# Patient Record
Sex: FEMALE | Race: WHITE
[De-identification: names, ages, dates, MRNs, and addresses within clinical notes are randomized per-mention and may not be internally consistent; named-entity substitution may affect disease eponyms.]

---

## 2018-07-16 ENCOUNTER — HOSPITAL ENCOUNTER (OUTPATIENT)
Dept: HOSPITAL 47 - RADBDWWP | Age: 58
Discharge: HOME | End: 2018-07-16
Attending: INTERNAL MEDICINE
Payer: MEDICARE

## 2018-07-16 DIAGNOSIS — M89.8X9: Primary | ICD-10-CM

## 2018-07-16 PROCEDURE — 77080 DXA BONE DENSITY AXIAL: CPT

## 2018-07-16 NOTE — BD
EXAMINATION TYPE: Axial Bone Density

 

DATE OF EXAM: 7/16/2018

 

COMPARISON: NONE

 

CLINICAL HISTORY: 57-year-old female with bone pain

 

Height:  64 IN

Weight:  173 LBS

 

FRAX RISK QUESTIONS:

          Secondary Osteoporosis:

   Current Tobacco Use: YES

 

RISK FACTORS 

HISTORY OF: 

History of Wrist Fracture: RT WRIST AGE AGE 49

When: AGE 49

Active: MODERATE

Diet low in dairy products/other sources of calcium:  YES

Postmenopausal woman: AGE 49

Frequent falls: YES  DUE TO NUMBNESS IN LEGS

MEDICATIONS: 

Additional Medications: LAMICTAL, QUETIPINE, EFFEXOR, ATIVAN, TRAMADOL, POLYETHYLENE, LISINOPRIL, 

 

 

 

EXAM MEASUREMENTS: 

Bone mineral densitometry was performed using the Printed Piece System.

Bone mineral density as measured about the Lumbar spine is:  

----- L1-L4(G/cm2): 1.390

T Score Values are as follows:

----- L2: 0.7

----- L3: 2.6

----- L4: 2.9

----- L1-L4: 1.8

Bone mineral density BASELINE

 

Bone mineral density about the R hip (g/cm2): 1.075

Bone mineral density about the L hip (g/cm2): 1.105

T Score values are as follows:

-----R Neck: 0.3

-----L Neck: 0.5

-----R Total: 0.0

-----L Total: 0.3

Bone mineral density BASELINE

 

 

IMPRESSION:

Normal (Values between +1 and -1 indicate normal bone mass).  Consider repeating this study in 5 year
s or sooner if there is some new clinical indication.

 

 

 

 

 

NOTE:  T-SCORE=SD OF THE YOUNG ADULT MEAN.

## 2018-08-07 ENCOUNTER — HOSPITAL ENCOUNTER (INPATIENT)
Dept: HOSPITAL 47 - EC | Age: 58
LOS: 9 days | Discharge: HOME | DRG: 885 | End: 2018-08-16
Payer: MEDICARE

## 2018-08-07 DIAGNOSIS — Z81.8: ICD-10-CM

## 2018-08-07 DIAGNOSIS — L65.9: ICD-10-CM

## 2018-08-07 DIAGNOSIS — J44.9: ICD-10-CM

## 2018-08-07 DIAGNOSIS — K51.90: ICD-10-CM

## 2018-08-07 DIAGNOSIS — M54.9: ICD-10-CM

## 2018-08-07 DIAGNOSIS — Z87.19: ICD-10-CM

## 2018-08-07 DIAGNOSIS — Z91.5: ICD-10-CM

## 2018-08-07 DIAGNOSIS — R45.851: ICD-10-CM

## 2018-08-07 DIAGNOSIS — F31.5: Primary | ICD-10-CM

## 2018-08-07 DIAGNOSIS — Z81.1: ICD-10-CM

## 2018-08-07 DIAGNOSIS — F17.200: ICD-10-CM

## 2018-08-07 DIAGNOSIS — F41.0: ICD-10-CM

## 2018-08-07 DIAGNOSIS — Z79.899: ICD-10-CM

## 2018-08-07 DIAGNOSIS — Z82.49: ICD-10-CM

## 2018-08-07 DIAGNOSIS — K57.90: ICD-10-CM

## 2018-08-07 DIAGNOSIS — Z90.710: ICD-10-CM

## 2018-08-07 DIAGNOSIS — Z82.5: ICD-10-CM

## 2018-08-07 DIAGNOSIS — I10: ICD-10-CM

## 2018-08-07 DIAGNOSIS — H26.9: ICD-10-CM

## 2018-08-07 PROCEDURE — 81003 URINALYSIS AUTO W/O SCOPE: CPT

## 2018-08-07 PROCEDURE — 99285 EMERGENCY DEPT VISIT HI MDM: CPT

## 2018-08-07 PROCEDURE — 83036 HEMOGLOBIN GLYCOSYLATED A1C: CPT

## 2018-08-07 PROCEDURE — 80053 COMPREHEN METABOLIC PANEL: CPT

## 2018-08-07 PROCEDURE — 85025 COMPLETE CBC W/AUTO DIFF WBC: CPT

## 2018-08-07 PROCEDURE — 81025 URINE PREGNANCY TEST: CPT

## 2018-08-07 PROCEDURE — 80061 LIPID PANEL: CPT

## 2018-08-07 PROCEDURE — 80306 DRUG TEST PRSMV INSTRMNT: CPT

## 2018-08-07 PROCEDURE — 94640 AIRWAY INHALATION TREATMENT: CPT

## 2018-08-07 PROCEDURE — 84443 ASSAY THYROID STIM HORMONE: CPT

## 2018-08-07 RX ADMIN — QUETIAPINE SCH MG: 400 TABLET ORAL at 20:42

## 2018-08-07 NOTE — ED
General Adult HPI





- General


Chief complaint: Psychiatric Symptoms


Stated complaint: EPS eval


Time Seen by Provider: 18 13:40


Source: patient, EMS, RN notes reviewed


Mode of arrival: EMS


Limitations: no limitations





- History of Present Illness


Initial comments: 





This a 57-year-old female presents emergency Department complaining that she's 

been hallucinating lately and hearing voices.  Patient states she thought 

yesterday her and her doctor were in a car together driving somewhere and also 

had to drive around her boyfriend he was standing in the middle of the road her 

boyfriend confirmed to her today that that did not happen.  Also the patient 

states she believes yesterday she went to her doctor's office to do her grocery 

shopping.  Patient also states she's been having which she believes to be the 

devil talking her to tell her to kill herself.  Patient states she is not 

suicidal and she is not listening to home but she does keep her in the voices.  

She also states she hears God talking to her telling her it'll be all right.  

Patient denies any physical complaints today.  Patient denies headache patient 

denies numbness weakness.  Patient denies any chest pain difficulty breathing 

shortest breath per patient denies abdominal pain patient denies nausea 

vomiting diarrhea.  Patient states that she has not drank or done any illegal 

drugs





- Related Data


 Home Medications











 Medication  Instructions  Recorded  Confirmed


 


Polyethylene Glycol 3350 [Miralax] 17 gm PO DAILY 03/10/17 08/07/18


 


lamoTRIgine [LaMICtal] 200 mg PO HS 17


 


LORazepam [Ativan] 1 mg PO TID PRN 18


 


Lisinopril [Zestril] 10 mg PO DAILY 18


 


Venlafaxine HCl ER [Effexor Xr] 75 mg PO BID-W/MEALS 18


 


lamoTRIgine [LaMICtal] 150 mg PO QAM 18


 


traMADol HCL [Ultram] 50 mg PO BID 18








 Previous Rx's











 Medication  Instructions  Recorded


 


QUEtiapine FUMARATE [Seroquel Xr] 400 mg PO BID #60 tab.er.24h 03/15/17











 Allergies











Allergy/AdvReac Type Severity Reaction Status Date / Time


 


No Known Allergies Allergy   Verified 18 14:35














Review of Systems


ROS Statement: 


Those systems with pertinent positive or pertinent negative responses have been 

documented in the HPI.





ROS Other: All systems not noted in ROS Statement are negative.





Past Medical History


Past Medical History: Hypertension


Additional Past Medical History / Comment(s): GI bleed 2 requiring blood 

transfusion the last one in 2015, hypertension on diet control


History of Any Multi-Drug Resistant Organisms: None Reported


Past Surgical History: Hysterectomy, Orthopedic Surgery, Tubal Ligation


Additional Past Surgical History / Comment(s): ACL repair on the right knee, 

right second finger surgery, tubal ligation


Past Psychological History: Anxiety, Bipolar, Depression


Smoking Status: Current every day smoker


Past Alcohol Use History: None Reported


Past Drug Use History: None Reported





- Past Family History


  ** Father


Additional Family Medical History / Comment(s): Father  at 75 from COPD.





  ** Mother


Additional Family Medical History / Comment(s): Mother  at age 76 from 

heart failure and COPD





  ** Brother(s)


Additional Family Medical History / Comment(s): Patient has 1 brother with 

history of alcohol abuse currently sober.  Patient does not have any sisters.





General Exam





- General Exam Comments


Initial Comments: 





GENERAL:


Patient is well-developed and well-nourished.  Patient is nontoxic and well-

hydrated and is in no acute distress.





ENT:


Neck is soft and supple.  No significant lymphadenopathy is noted.  Oropharynx 

is clear.  Moist mucous membranes.  Neck has full range of motion without 

eliciting any pain.  





EYES:


The sclera were anicteric and conjunctiva were pink and moist.  Extraocular 

movements were intact and pupils were equal round and reactive to light.  

Eyelids were unremarkable.





PULMONARY:


Unlabored respirations.  Good breath sounds bilaterally.  No audible rales 

rhonchi or wheezing was noted.





CARDIOVASCULAR:


There is a regular rate and rhythm without any murmurs gallops or rubs.  





ABDOMEN:


Soft and nontender with normal bowel sounds.  





SKIN:


Skin is clear with no lesions or rashes and otherwise unremarkable.





NEUROLOGIC:


Patient is alert and oriented x3.  Cranial nerves II through XII are grossly 

intact.  Motor and sensory are also intact.  Normal speech, volume and content.

  Symmetrical smile.  





MUSCULOSKELETAL:


Normal extremities with adequate strength and full range of motion.  No lower 

extremity swelling or edema.  No calf tenderness.





LYMPHATICS:


No significant lymphadenopathy is noted





PSYCHIATRIC:


Patient states she's hallucinating and is hearing voices.  Patient denies 

suicidal or homicidal ideations the voices are telling her to


Limitations: no limitations





Course


 Vital Signs











  18





  13:37


 


Temperature 98.0 F


 


Pulse Rate 116 H


 


Respiratory 18





Rate 


 


Blood Pressure 138/90


 


O2 Sat by Pulse 95





Oximetry 














Disposition


Clinical Impression: 


 Psychosis





Disposition: ADMITTED AS IP TO THIS HOSP


Referrals: 


Roselyn Carlos MD [Primary Care Provider] - 1-2 days


Time of Disposition: 15:42

## 2018-08-08 LAB
ALBUMIN SERPL-MCNC: 4.6 G/DL (ref 3.5–5)
ALP SERPL-CCNC: 78 U/L (ref 38–126)
ALT SERPL-CCNC: 26 U/L (ref 9–52)
ANION GAP SERPL CALC-SCNC: 11 MMOL/L
AST SERPL-CCNC: 30 U/L (ref 14–36)
BASOPHILS # BLD AUTO: 0 K/UL (ref 0–0.2)
BASOPHILS NFR BLD AUTO: 1 %
BUN SERPL-SCNC: 9 MG/DL (ref 7–17)
CALCIUM SPEC-MCNC: 10 MG/DL (ref 8.4–10.2)
CHLORIDE SERPL-SCNC: 105 MMOL/L (ref 98–107)
CHOLEST SERPL-MCNC: 273 MG/DL (ref ?–200)
CO2 SERPL-SCNC: 24 MMOL/L (ref 22–30)
EOSINOPHIL # BLD AUTO: 0.2 K/UL (ref 0–0.7)
EOSINOPHIL NFR BLD AUTO: 2 %
ERYTHROCYTE [DISTWIDTH] IN BLOOD BY AUTOMATED COUNT: 4.21 M/UL (ref 3.8–5.4)
ERYTHROCYTE [DISTWIDTH] IN BLOOD: 13 % (ref 11.5–15.5)
GLUCOSE SERPL-MCNC: 87 MG/DL (ref 74–99)
HBA1C MFR BLD: 5.8 % (ref 4–6)
HCT VFR BLD AUTO: 39 % (ref 34–46)
HDLC SERPL-MCNC: 61 MG/DL (ref 40–60)
HGB BLD-MCNC: 12.5 GM/DL (ref 11.4–16)
LDLC SERPL CALC-MCNC: 179 MG/DL (ref 0–99)
LYMPHOCYTES # SPEC AUTO: 2.4 K/UL (ref 1–4.8)
LYMPHOCYTES NFR SPEC AUTO: 28 %
MCH RBC QN AUTO: 29.7 PG (ref 25–35)
MCHC RBC AUTO-ENTMCNC: 32.1 G/DL (ref 31–37)
MCV RBC AUTO: 92.5 FL (ref 80–100)
MONOCYTES # BLD AUTO: 0.5 K/UL (ref 0–1)
MONOCYTES NFR BLD AUTO: 6 %
NEUTROPHILS # BLD AUTO: 5.1 K/UL (ref 1.3–7.7)
NEUTROPHILS NFR BLD AUTO: 61 %
PLATELET # BLD AUTO: 442 K/UL (ref 150–450)
POTASSIUM SERPL-SCNC: 4.4 MMOL/L (ref 3.5–5.1)
PROT SERPL-MCNC: 7.1 G/DL (ref 6.3–8.2)
SODIUM SERPL-SCNC: 140 MMOL/L (ref 137–145)
TRIGL SERPL-MCNC: 167 MG/DL (ref ?–150)
WBC # BLD AUTO: 8.4 K/UL (ref 3.8–10.6)

## 2018-08-08 RX ADMIN — VENLAFAXINE HYDROCHLORIDE SCH MG: 75 CAPSULE, EXTENDED RELEASE ORAL at 09:38

## 2018-08-08 RX ADMIN — POLYETHYLENE GLYCOL 3350 SCH: 17 POWDER, FOR SOLUTION ORAL at 08:39

## 2018-08-08 RX ADMIN — QUETIAPINE SCH MG: 400 TABLET ORAL at 08:41

## 2018-08-08 RX ADMIN — LISINOPRIL SCH MG: 10 TABLET ORAL at 08:43

## 2018-08-08 NOTE — P.PN
Progress Note - Text


Progress Note Date: 08/08/18





patient with acute psychosis , and sedated could not be evaluated at this time. 


patient will be seen at a later time when more appropriate

## 2018-08-08 NOTE — P.CONS
History of Present Illness





- Reason for Consult


Consult date: 18


Medical management





- Chief Complaint


Hallucination





- History of Present Illness





This is a 57-year-old female with past medical history noted below significant 

for underlying bipolar disorder who presented to the emergency room with 

audible hallucination.  Apparently patient believes on presentation that God 

was talking to her.  She denies any suicidal thoughts or ideation.  She denies 

any headache, weakness, or numbness.  She was very agitated last night.  When I 

saw her this morning patient was awake and alert.  She was cooperative.  She 

denies any suicidal thoughts at this time.  She doesn't have any complaints or 

concerns.  I was asked to see her for medical management





Review of Systems





Review of system:


14 points review of systems were obtained and were negative except to what were 

mentioned in the HPI.





Past Medical History


Past Medical History: GI Bleed, Hypertension, Osteoarthritis (OA)


Additional Past Medical History / Comment(s): GI bleed 2 requiring blood 

transfusion the last one in 2015, hypertension on diet control, 

alopecia, ibs, ulcerative colitis, diffuse diverticulosis, shingles that 

affected rt eye,cataracts. pt stated she ahd a pne vaccine in less than 5 years 

not sure ofdate and writer omar to verify at time of admit.


History of Any Multi-Drug Resistant Organisms: None Reported


Past Surgical History: Hysterectomy, Orthopedic Surgery, Tonsillectomy, Tubal 

Ligation


Additional Past Surgical History / Comment(s): ACL repair on the right knee, 

right second finger  severed tendons repaired.tubal ligation


Past Anesthesia/Blood Transfusion Reactions: No Reported Reaction


Past Psychological History: Anxiety, Bipolar, Depression


Smoking Status: Current every day smoker


Past Alcohol Use History: None Reported


Additional Past Alcohol Use History / Comment(s): Patient is a smoker one pack 

per day (started ).  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  and clean from drug use(angle dust,acid,mesculine, cocaine -since She 

denies any medical marijuana, marijuana or street drug use currently.  She is 

single and she has 3 children


Past Drug Use History: None Reported





- Past Family History


  ** Father


Additional Family Medical History / Comment(s): Father  at 75 from COPD.





  ** Mother


Additional Family Medical History / Comment(s): Mother  at age 76 from 

heart failure and COPD





  ** Brother(s)


Additional Family Medical History / Comment(s): Patient has 1 brother with 

history of alcohol abuse currently sober.  Patient does not have any sisters.





Medications and Allergies


 Home Medications











 Medication  Instructions  Recorded  Confirmed  Type


 


Polyethylene Glycol 3350 [Miralax] 17 gm PO DAILY 03/10/17 08/07/18 History


 


QUEtiapine FUMARATE [Seroquel Xr] 400 mg PO BID #60 tab.er.24h 03/15/17 08/07/

18 Rx


 


lamoTRIgine [LaMICtal] 200 mg PO HS 17 History


 


LORazepam [Ativan] 1 mg PO TID PRN 18 History


 


Lisinopril [Zestril] 10 mg PO DAILY 18 History


 


Venlafaxine HCl ER [Effexor Xr] 75 mg PO BID-W/MEALS 18 History


 


lamoTRIgine [LaMICtal] 150 mg PO QAM 18 History


 


traMADol HCL [Ultram] 50 mg PO BID 18 History











 Allergies











Allergy/AdvReac Type Severity Reaction Status Date / Time


 


No Known Allergies Allergy   Verified 18 14:35














Physical Exam


Vitals: 


 Vital Signs











  Temp Pulse Pulse Resp BP BP Pulse Ox


 


 18 08:40    99  18   128/81 


 


 18 05:55  98.1 F   99  16   166/98 


 


 18 19:14    98  14   134/84  96


 


 18 18:39   93   18  137/86   96


 


 18 13:37  98.0 F  116 H   18  138/90   95








 Intake and Output











 18





 22:59 06:59 14:59


 


Other:   


 


  Weight 76 kg  














General:  The patient is awake and alert, in no distress


Eye: there is normal conjunctiva bilaterally.  


Neck:  The neck is supple, there is no  JVD.  


Cardiovascular:  Normal  S1-S2, no S3-S4, no murmurs.


Respiratory: Lungs clear to auscultation bilaterally


Gastrointestinal: Abdomen is soft, nontender


Musculoskeletal:  There is no pedal edema.  


Neurological:. Speech is normal. 


Skin:  Skin is warm and dry 





Results


CBC & Chem 7: 


 18 08:21





 18 08:21


Labs: 


 Abnormal Lab Results - Last 24 Hours (Table)











  18 Range/Units





  08:21 


 


Triglycerides  167 H  (<150)  mg/dL


 


Cholesterol  273 H  (<200)  mg/dL


 


LDL Cholesterol, Calc  179 H  (0-99)  mg/dL


 


HDL Cholesterol  61 H  (40-60)  mg/dL














Assessment and Plan


Assessment: 





1.  Bipolar disorder


2.  Major depressive disorder


3.  Audible hallucination


4.  Psychosocial dysfunction


5.  Tobacco abuse: Counseled extensively to quit


6.  Essential hypertension, blood pressure well-controlled continue current 

dose of lisinopril





Today, I reviewed her medication list and lab work results.  Management per 

psychiatry team.  We will continue to follow-up on her on as-needed basis.  

Thank you for the consultation.
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
## 2018-08-09 RX ADMIN — VENLAFAXINE HYDROCHLORIDE SCH MG: 75 CAPSULE, EXTENDED RELEASE ORAL at 09:24

## 2018-08-09 RX ADMIN — POLYETHYLENE GLYCOL 3350 SCH: 17 POWDER, FOR SOLUTION ORAL at 10:57

## 2018-08-09 RX ADMIN — LISINOPRIL SCH MG: 10 TABLET ORAL at 09:24

## 2018-08-09 NOTE — P.PN
Progress Note - Text


Progress Note Date: 08/09/18





Interval History: Patient is a 57-year-old female who reports that she had one 

visual hallucination last evening otherwise no auditory hallucinations and 

states she is feeling more hopeful.  She described feeling more revved up today 

but no suicidal thoughts and not feeling paranoid.  She states that she was 

able to eat breakfast this morning.  Patient reported no side effects from the 

changes in her medication and states she is not feeling any flulike symptoms 

from the decrease in the Effexor dose.  Patient reported that she is hopeful 

that the changes in her medications will be beneficial.





Mental Status: Appearance/Attitude: Patient is appropriately dressed, made good 

eye contact and was cooperative


Behavior: Patient did not exhibit any psychomotor agitation or retardation


Speech/Language: Patient's speech was spontaneous of normal volume and rhythm 

and she was coherent.


Thought Process: Patient was goal-directed there is no evidence of loose 

association or flight of ideas


Thought Content: Patient denied any auditory hallucinations and states she had 

one visual hallucination yesterday at night and asked staff to reorient her.  

No delusions or paranoid ideation were elicited.  Patient states that she slept 

fairly well last evening but reports feeling revved up today.  She states that 

she was able to eat breakfast.


Suicidal/Homicidal Ideation: Patient denied any current suicidal or homicidal 

ideation


Sensorium/Cognition: Patient is alert and oriented to person, place, time


Mood/Affect: Patient's mood is brighter today and her affect is appropriate


Insight/Judgment: Patient's insight and judgment are fair





Assessment: Patient reports feeling more hopeful, states that she is not 

hearing voices, and states that she had one visual hallucination last night and 

asked staff to reorient her.  She reported feeling more revved up today though.

  She states that she was able to eat breakfast this morning.  Patient states 

she's been attending groups and activities.  Elba at that the patient slept 

for 6 hours last night.





Plan: Patient will continue on her Lamictal at one or 50 mg the morning 200 at 

bedtime, will decrease her Seroquel tomorrow to 200 mg twice a day beginning 

with the at bedtime dose, we'll also consider increasing her Abilify to 5 mg 

tomorrow.  Patient will have a lower dose of Effexor beginning tomorrow 37-1/2 

mg extended release and after 3 days we'll discontinue it.  Patient continues 

to require hospitalization to stabilize her mood.

## 2018-08-10 LAB
PH UR: 6.5 [PH] (ref 5–8)
SP GR UR: 1.01 (ref 1–1.03)
UROBILINOGEN UR QL STRIP: <2 MG/DL (ref ?–2)

## 2018-08-10 RX ADMIN — LISINOPRIL SCH MG: 10 TABLET ORAL at 09:13

## 2018-08-10 RX ADMIN — POLYETHYLENE GLYCOL 3350 SCH: 17 POWDER, FOR SOLUTION ORAL at 09:13

## 2018-08-10 RX ADMIN — VENLAFAXINE HYDROCHLORIDE SCH MG: 37.5 CAPSULE, EXTENDED RELEASE ORAL at 09:13

## 2018-08-10 NOTE — P.PN
Progress Note - Text


Progress Note Date: 08/10/18





Interval History: Patient is a 57-year-old female who was seen today, she 

reports that she is not feeling quite as energized as she was yesterday but 

states that her appetite has improved.  She reports still having visual 

hallucinations at night when she gets up in the middle of the night but staff 

reorients her and she states she is fine after that.  Patient denies any 

suicidal ideation and states she did sleep well last night and felt rested this 

morning.  Patient reports no side effects from the adjustments in her 

medication other than feeling slightly sedated this morning.





Mental Status: Appearance/Attitude: Patient is casually dressed, makes good eye 

contact and was cooperative.


Behavior: She does not exhibit any psychomotor agitation or retardation.


Speech/Language: Patient speech was spontaneous of normal volume and rhythm and 

she is coherent.


Thought Process: Patient was goal-directed there is no evidence of loose 

association or flight of ideas


Thought Content: Patient denied any auditory hallucinations and states she 

continues to have visual hallucinations at night when she awakens in the middle 

of the night.  No paranoid or delusional ideation was elicited.  Patient states 

that she slept for 6-1/2 hours last night and also reports that her appetite 

has improved.  Patient reports no side effects from the changes in her 

medication other than feeling slightly sedated this morning.


Suicidal/Homicidal Ideation: Patient denied any suicidal or homicidal ideation 

at this time


Sensorium/Cognition: Patient is alert and oriented to person, place, time and 

her recent and remote memory grossly intact


Mood/Affect: Patient's mood is more depressed today and her affect is 

appropriate


Insight/Judgment: Patient's insight and judgment are intact





Assessment: Patient and I discussed fact that she is not feeling quite as 

energized today in fact was feeling slightly sedated after her morning 

medication.  She reports however that her appetite has improved and she slept 

well last night.  She reports of visual hallucination last night when she 

awakened in the middle of the night but was reoriented by staff.  She states 

that she is attending groups and activities.  Patient reported no other side 

effects from the medication changes.





Plan: We'll continue Effexor 37.5 mg extended release and discontinue after her 

dose on Ronaldo morning, we'll decrease her Seroquel to 200 mg twice a day, and 

decreased further to 100 mg twice a day beginning Ronaldo evening.  Patient's 

Abilify will be increased to 5 mg tonight and increase it to 10 mg on Sunday 

night.  Patient continues to require hospitalization to stabilize her mood and 

continue the cross titration of medications.

## 2018-08-11 RX ADMIN — POLYETHYLENE GLYCOL 3350 SCH GM: 17 POWDER, FOR SOLUTION ORAL at 15:43

## 2018-08-11 RX ADMIN — POLYETHYLENE GLYCOL 3350 SCH: 17 POWDER, FOR SOLUTION ORAL at 08:30

## 2018-08-11 RX ADMIN — ACETAMINOPHEN PRN MG: 325 TABLET, FILM COATED ORAL at 20:14

## 2018-08-11 RX ADMIN — VENLAFAXINE HYDROCHLORIDE SCH MG: 37.5 CAPSULE, EXTENDED RELEASE ORAL at 08:33

## 2018-08-11 RX ADMIN — LISINOPRIL SCH MG: 10 TABLET ORAL at 08:33

## 2018-08-11 NOTE — P.PN
Progress Note - Text





Interval history: The patient is found in the hallway she follows me to an 

interview room.  She indicates her mood is better today.  She states she 

continues to experience a visual hallucination once or twice a day but states 

at night might be part of her dream.  It appears her medications are being 

changed and she is compliant with her medications.  There is a plan to remove 

the Effexor her Seroquel has been decreased.  She has no questions or concerns 

regarding her psychotropic medication.  Vital signs reviewed.





Mental status exam: The patient's pleasant and cooperative.  She is soft-

spoken.  She is dressed in her own clothing and wears eyeglasses.  She 

indicates her mood is better today.  She is reporting no suicidal or homicidal 

ideation intent or plan.  She states she feels safe here in the hospital.  She 

endorses no auditory hallucinations.  She is endorsing no current visual 

hallucinations.  There is no verbal or physical aggressiveness she demonstrates 

no abnormal involuntary movements.  Insight and judgment limited.  Affect is 

constricted.





Plan: We will continue with the planned changes to her psychotropic medication.

  We will continue to monitor her for safety and encourage her participation in 

the milieu.

## 2018-08-12 RX ADMIN — LISINOPRIL SCH MG: 10 TABLET ORAL at 08:49

## 2018-08-12 RX ADMIN — ACETAMINOPHEN PRN MG: 325 TABLET, FILM COATED ORAL at 12:44

## 2018-08-12 RX ADMIN — VENLAFAXINE HYDROCHLORIDE SCH MG: 37.5 CAPSULE, EXTENDED RELEASE ORAL at 08:49

## 2018-08-12 RX ADMIN — POLYETHYLENE GLYCOL 3350 SCH: 17 POWDER, FOR SOLUTION ORAL at 08:49

## 2018-08-12 NOTE — P.PN
Progress Note - Text





Interval history: The patient is found in her room she follows me to an 

interview room.  She reports that she feels very tired.  She continues to 

endorse an auditory hallucination once a day.  She states that she needed 

reassurance from staff that there was on a monster in her room.  She has taken 

her last dose of the Effexor.  Her Seroquel is scheduled to be reduced and the 

Abilify has been titrated.  She states that she has missed groups this morning.





Mental status exam: The patient is an alert pleasant cooperative female 

appearing her stated age.  She is dressed in her own clothing she is mildly 

disheveled.  She states her mood is very tired.  She states that she continues 

to have a visual hallucination once a day is still has some feelings of 

fearfulness.  She reports no suicidal thoughts or homicidal thoughts.  Insight 

and judgment limited.  She demonstrates no verbal or physical aggressiveness 

she demonstrates no abnormal involuntary movements.  She is oriented to person 

place and date.  Affect is constricted.





Plan: The patient's medication has been changed as scheduled.  The Seroquel is 

reduced the Abilify has been titrated Effexor XR will be discontinued.  Vital 

signs reviewed.  She is encouraged to participate in the milieu we will 

continue to monitor her for safety

## 2018-08-13 RX ADMIN — LISINOPRIL SCH MG: 10 TABLET ORAL at 09:08

## 2018-08-13 RX ADMIN — ACETAMINOPHEN PRN MG: 325 TABLET, FILM COATED ORAL at 09:11

## 2018-08-13 RX ADMIN — POLYETHYLENE GLYCOL 3350 SCH: 17 POWDER, FOR SOLUTION ORAL at 09:09

## 2018-08-13 RX ADMIN — POLYETHYLENE GLYCOL 3350 SCH GM: 17 POWDER, FOR SOLUTION ORAL at 18:53

## 2018-08-13 RX ADMIN — ACETAMINOPHEN PRN MG: 325 TABLET, FILM COATED ORAL at 01:13

## 2018-08-13 NOTE — P.PN
Progress Note - Text


Progress Note Date: 08/13/18


Interval History: Patient is a 57-year-old female who was seen this morning and 

states that she's feeling tired, she stated that the visual hallucinations are 

less intense but can she continues to have them.  Patient also reported no 

auditory hallucinations and states that she's been attending groups and 

activities.  Patient again discussed her use of Ativan when she leaves the 

house and she has been using it here.  Patient denied any suicidal ideation.  

She states she didn't sleep well last night she was up several times.





Mental Status: Appearance/Attitude: Patient is casually dressed, appears tired, 

makes good eye contact and was cooperative.


Behavior: Patient did not exhibit any psychomotor agitation or retardation.


Speech/Language: Speech was spontaneous of normal volume and rhythm and she was 

coherent


Thought Process: Patient was goal-directed there was no evidence of loose 

association or flight of ideas


Thought content: Patient denied any auditory hallucinations and states her 

visual hallucinations are less intense but continue to occur, no delusions or 

paranoid ideation were elicited.  Patient states that she's feeling tired today 

she didn't sleep well last night but states her appetite has been better.


Suicidal/Homicidal Ideation: Patient denied any current suicidal or homicidal 

ideation


Sensorium/Cognition: Patient is alert and oriented to person, place, and time 

and her recent and remote memory are grossly intact


Mood/Affect: Patient's mood is depressed and her affect is blunted


Insight/Judgment: Patient's insight and judgment are fair





Assessment: Reports that she's feeling tired today she didn't sleep well last 

night, she states that her visual hallucinations are less intense but they 

continue to occur at night.  Patient reports she is attending groups and 

activities.  Patient states that she is not having any suicidal thoughts and is 

not feeling more depressed.





Plan:  Patient will continue on Abilify 10 mg at 7 PM, we'll decrease her 

Seroquel to 100 mg at bedtime for 2 nights and then discontinue.  Patient is no 

longer receiving Effexor.  Patient continues on Lamictal 1 or 50 mg the morning 

and 200 at bedtime.  We'll decrease the patient's Ativan to 0.5 mg twice a day 

when necessary for anxiety.  Patient and I discussed continuing to titrate her 

medication, patient continues to require hospitalization.

## 2018-08-14 RX ADMIN — DICYCLOMINE HYDROCHLORIDE PRN MG: 10 CAPSULE ORAL at 08:37

## 2018-08-14 RX ADMIN — ACETAMINOPHEN PRN MG: 325 TABLET, FILM COATED ORAL at 14:40

## 2018-08-14 RX ADMIN — ACETAMINOPHEN PRN MG: 325 TABLET, FILM COATED ORAL at 00:32

## 2018-08-14 RX ADMIN — POLYETHYLENE GLYCOL 3350 SCH GM: 17 POWDER, FOR SOLUTION ORAL at 08:37

## 2018-08-14 RX ADMIN — DICYCLOMINE HYDROCHLORIDE PRN MG: 10 CAPSULE ORAL at 20:55

## 2018-08-14 RX ADMIN — LISINOPRIL SCH MG: 10 TABLET ORAL at 08:37

## 2018-08-14 RX ADMIN — ACETAMINOPHEN PRN MG: 325 TABLET, FILM COATED ORAL at 10:57

## 2018-08-14 NOTE — P.PN
Progress Note - Text


Progress Note Date: 08/14/18





Interval History: Patient is a 57-year-old female who was seen today and she 

reports that her joints ache and this is been keeping her awake at night.  She 

reports that she had no visual hallucinations last night.  She states that she 

is not having any racing thoughts and does not feel revved up.  She states that 

she is not feeling depressed or suicidal.  Patient slept only about 5 hours 

last night and she states the pain kept her awake.





Mental Status: Appearance/Attitude: Patient is casually dressed, makes eye 

contact and is cooperative


Behavior: Patient does not display any psychomotor agitation or retardation


Speech/Language: Patient's speech is spontaneous and normal volume and rhythm 

and she is coherent


Thought Process: Patient is goal-directed there is no evidence of loose 

association or flight of ideas and she denies racing thoughts


Thought Content: Patient denies any auditory or visual hallucinations, no 

delusions or paranoid ideation were elicited.  Patient states she is not 

feeling revved up nor she feeling depressed but the patient does states she is 

tired due to her joint pain which kept her up last night.  Patient states that 

her appetite is good.  Patient states that she is not having any side effects 

from the medication.


Suicidal/Homicidal Ideation: Patient denies any current suicidal or homicidal 

ideation


Sensorium/Cognition: Patient is alert and oriented to person, place, and time 

and her recent and remote memory are grossly intact


Mood/Affect: Mood is stable and her affect is appropriate


Insight/Judgment: Patient's insight and judgment are fair





Assessment: Patient reports that she is not feeling revved up or depressed, 

reports no side effects from the medication but is complaining of pain in her 

joints keeping her up last night.  Patient states that she only slept about 5 

hours and feels somewhat tired this morning.  Patient states that she is on any 

suicidal thoughts and feels more stable on the current medication regimen.  She 

reports no side effects from the medication.  Patient has been attending groups 

and activities.  Patient states her appetite is good.  





Plan: Patient will continue on Abilify 10 mg at 7 PM and Lamictal 1 or 50 mg 

the morning and 200 mg at night and tonight will be a last dose of Seroquel 100 

mg and it will be discontinued at that point.  Patient and I discussed seeing 

how she does on the Abilify and Lamictal combination, encourage the patient to 

use the Tylenol as needed for her joint pain to see if that relieves it.  

Patient continues to require hospitalization to further stabilize her mood and 

we discussed possible discharge towards the end of the week.

## 2018-08-15 VITALS — RESPIRATION RATE: 18 BRPM

## 2018-08-15 RX ADMIN — LISINOPRIL SCH MG: 10 TABLET ORAL at 09:25

## 2018-08-15 RX ADMIN — POLYETHYLENE GLYCOL 3350 SCH GM: 17 POWDER, FOR SOLUTION ORAL at 09:26

## 2018-08-15 RX ADMIN — ACETAMINOPHEN PRN MG: 325 TABLET, FILM COATED ORAL at 20:05

## 2018-08-15 RX ADMIN — DICYCLOMINE HYDROCHLORIDE PRN MG: 10 CAPSULE ORAL at 09:28

## 2018-08-15 RX ADMIN — ACETAMINOPHEN PRN MG: 325 TABLET, FILM COATED ORAL at 02:48

## 2018-08-15 RX ADMIN — ACETAMINOPHEN PRN MG: 325 TABLET, FILM COATED ORAL at 09:28

## 2018-08-15 RX ADMIN — ACETAMINOPHEN PRN MG: 325 TABLET, FILM COATED ORAL at 14:55

## 2018-08-15 NOTE — P.PN
Progress Note - Text


Progress Note Date: 08/15/18





Interval History: Patient is a 57-year-old female who was seen today and she 

reports that she slept about 5 hours last night but then was able to go back to 

sleep for another hour or so.  Patient states that she had no visual 

hallucinations last night at all.  She states that she is not feeling suicidal 

and her mood has been more stable.  She states that her pain is better today she

's been using the Tylenol as needed.  Patient states that she is still feeling 

tired in the morning, her appetite is good.





Mental Status: Appearance/Attitude: Patient is casually dressed, makes good eye 

contact and was cooperative


Behavior: Patient does not display any psychomotor agitation or retardation


Speech/Language: Patient's speech is spontaneous of normal volume and rhythm 

and she is coherent


Thought Process: Patient is goal-directed there is no evidence of loose 

association or flight of ideas


Thought Content: Patient denies any auditory or visual hallucinations no 

delusions or paranoid ideation were elicited.  Patient states her pain level is 

better today she slept 5-6 hours last night but states she still feels somewhat 

tired.  She states that her mood is much more stable and she is more positive 

in her outlook.


Suicidal/Homicidal Ideation: Patient denies any current suicidal or homicidal 

ideation


Sensorium/Cognition: She is alert and oriented to person, place, and time and 

her recent and remote memory are grossly intact


Mood/Affect: Patient's mood is stable and her affect is appropriate


Insight/Judgment: Patient's insight and judgment are intact





Assessment: Patient states that she slept 5-6 hours but still feels slightly 

tired today, she states her pain is much better she is been using Tylenol but 

overall thinks it is decreasing.  Patient states that she had no visual 

hallucinations last night.  Patient states that she feels her mood is more 

stable, and reports that she has no side effects from her current medications.





Plan: Patient continue on Abilify 10 mg at 7 PM and Lamictal 150 mg the morning 

and 200 mg at bedtime and Seroquel will be discontinued.  Patient and I 

discussed discharge tomorrow and the patient was agreeable with this that she 

feels that she is doing much better on the current medications especially as 

she is no longer having visual hallucinations.

## 2018-08-16 VITALS — HEART RATE: 99 BPM | SYSTOLIC BLOOD PRESSURE: 127 MMHG | DIASTOLIC BLOOD PRESSURE: 95 MMHG | TEMPERATURE: 98.4 F

## 2018-08-16 RX ADMIN — DICYCLOMINE HYDROCHLORIDE PRN MG: 10 CAPSULE ORAL at 05:20

## 2018-08-16 RX ADMIN — POLYETHYLENE GLYCOL 3350 SCH GM: 17 POWDER, FOR SOLUTION ORAL at 06:08

## 2018-08-16 RX ADMIN — LISINOPRIL SCH MG: 10 TABLET ORAL at 10:19

## 2018-08-16 RX ADMIN — ACETAMINOPHEN PRN MG: 325 TABLET, FILM COATED ORAL at 00:49

## 2018-08-16 NOTE — P.DS
Providers


Date of admission: 


08/07/18 18:11





Expected date of discharge: 08/16/18


Attending physician: 


Evelyne Ureña MD





Consults: 





 





08/07/18 19:16


Consult Physician Routine 


   Consulting Provider: [facility]


   Consult Reason/Comments: follow up h & P


   Do you want consulting provider notified?: Yes











[facility] 


[facility] Course: 





Discharge Diagnosis: Bipolar disorder, type I current episode depressed with 

psychotic features; anxiety disorder not otherwise specified





Reason for Admission: Patient is a 57-year-old female who presented to the 

emergency room reporting hallucinations for 3 weeks and suicidal ideation 

without a plan. Patient states that her symptoms were worse yesterday with the 

worst hallucination she states that she was at home and had a bizarre dream 

about driving a car, but states it was quite vivid and states that she also 

heard voices her parents talking.  She states that she has other visual 

hallucinations that are shadows or that there is a little boy.  Patient states 

that they've been going on for the last 3 weeks combined with a bad taste in 

her mouth and off smell.  She denies any history of seizure disorder.  She 

states that she does fine for 3 weeks and then has these episodes of visual and 

auditory hallucinations the last several days.  She states that she also feels 

confused for about 3 hours and then does fine after she rests at night.  

Patient states that she hasn't been seen by [facility] since 

November of last year.  She states that she also discontinued her Effexor on 

Monday after her morning dose because she thought it was causing problems.  

Patient states that she takes Ativan 1 mg twice a day on a regular basis and 

states she uses tramadol for in a week and does not take it on a scheduled 

basis at home.  Patient states her symptoms began in 2013 and a prior to that 

she had no complaints of psychiatric problems nor had she ever been treated for 

anything.  She states in 2017 she became sober and then had to go and take care 

of her parents and states in 2011 she moved in with her father who she states 

had Alzheimer's.  Patient states that in 2013 she became depressed with visual 

hallucinations wasn't sleeping was feeling tired and had suicidal thoughts with 

no plan and was admitted here to the hospital on the inpatient psychiatric 

unit.  She states that she was followed at [facility] from that 

time until 2017.  She states that she has had 2 prior admissions one in 2013 

and one in May 2017 which was her last admission here.  She states in 2017 she 

was having suicidal thoughts with a plan as well as visual hallucinations 

again.  She then stated that 2 months ago she began a [facility] for 

therapy.


Patient endorses a history of manic episodes stating the first one was in 2011 

the lasted for 2 weeks.  She describes increased energy decreased sleep and 

pressured speech and racing thoughts but no impulsive behavior.  She states 

that this alternates with depressive symptoms of crying, decreased 

concentration and feeling a lack of energy and tired with suicidal ideation.  

She states that the visual hallucinations occur in either mood phase.  She 

thought her recent visual hallucinations were due to her reaction from Effexor.

  She states last week she was depressed and this way she felt decent without 

suicidal thoughts could not explain to me why she voiced suicidal ideation when 

she came to the hospital.  Patient had been on Wellbutrin, Zoloft, Lamictal and 

Remeron when she was here in 2017 she currently is taking Seroquel extended 

release 4 mg twice a day, Lamictal 150 mg the morning 200 at bedtime, Effexor 

75 mg extended release in the morning and at bedtime and Ativan 1 mg 3 times a 

day on an as-needed basis.  Patient's prescribing his degree was looked up on 

[employer] and she did feel Ativan 1 mg tablets on July 11 #90 and tramadol 50 mg # 

120 filled on July 10.  Her prior prescriptions were Ativan #90 on June 14 and 

tramadol 50 mg tablets #120 on June 7.  Patient states that periodically she 

gets lost when she is driving he needs to call her boyfriend to tell her where 

she is.  Patient states that she attempted suicide 2 times in the past wanted 

the age of 14 and one at the age of 20 when she took overdoses but received no 

treatment at that time.  Patient also describes getting anxious when she is 

outside and uses Ativan when she goes shopping or other places gives a vague 

history of panic attacks.





Mental status on Admission: Appearance/Attitude: Patient is dressed casually, 

makes intermittent eye contact and was cooperative.


Behavior: Patient does not exhibit any psychomotor agitation or retardation.


Speech/Language: Patient's speech was spontaneous of normal volume and rhythm 

and she was coherent


Thought Process: Patient was goal-directed but did need some verbal redirection 

to provide information regarding her responses.  Patient did not exhibit flight 

of ideas but was at times tangential


Thought Content: Patient states that she hears voices of her parents, she 

states that she has visual hallucinations about things like when she is driving

, she also see as a little boy and shadows at times.  No delusional or paranoid 

ideation was elicited.  Patient states that she was feeling fine and then 

stated that she had not been feeling well for the last several weeks.  She also 

complains of an odd taste in her mouth as well as an odd smell but can't state 

when this started.  Patient denies a seizure disorder history.  Patient states 

that she is not sleeping well and has been feeling tired.


Suicidal/Homicidal Ideation: Patient states that she is not currently having 

any suicidal thoughts or homicidal thoughts


Sensorium/Cognition: Patient is alert and oriented to person, place, and time, 

she did serial sevens with 2 mistakes, she could spell world forwards and 

backwards she could only remember 2 objects after 5 minutes.  Patient also had 

great difficulty calculating her sons age if he was born in 1979.


Mood/Affect: Patient's mood was distressed and her affect was appropriate to 

her mood


Insight/Judgment: Patient's insight and judgment are fair








Hospital Course: Patient was admitted on a voluntary basis, placed on routine 

observation and group and activity therapy were ordered.  Patient also had 

routine laboratory studies as well as a medical consultation.  Patient was 

continued on her medications for her medical problems.  Patient and I discussed 

her medications we decided to continue her Lamictal at 150 mg in the morning 

200 mg at bedtime and discussed titrating her off of the Ativan and Seroquel 

and Effexor and replacing that with Abilify.  Patient was agreeable to this and 

the patient was slowly titrated off of Seroquel and titrated to a dose of 10 mg 

of Abilify.  Patient was also slowly titrated off of Effexor.  Patient reported 

that her visual hallucinations eventually stopped, her thinking improved she 

was able to concentrate and focus and states that her memory improved.  Patient 

reported no longer feeling depressed, no longer having any suicidal ideation.  

Patient states that her mood was much more stable and she was participating in 

groups and activities.  Patient was sleeping well at night except for the night 

prior to her discharge she states because she was excited about returning home.

  Patient had no anxiety or panic attacks while she was on the unit, she 

reported no side effects from the current medication regimen.  Patient felt 

that she was ready to return to her own home and will follow-up with outpatient 

counseling.  Patient was continued on Lamictal at her current dose and Abilify 

10 mg.








Allergies





No Known Allergies Allergy (Verified 08/07/18 14:35)


 








 Laboratory Last Values











WBC  8.4 k/uL (3.8-10.6)   08/08/18  08:21    


 


RBC  4.21 m/uL (3.80-5.40)   08/08/18  08:21    


 


Hgb  12.5 gm/dL (11.4-16.0)   08/08/18  08:21    


 


Hct  39.0 % (34.0-46.0)   08/08/18  08:21    


 


MCV  92.5 fL (80.0-100.0)   08/08/18  08:21    


 


MCH  29.7 pg (25.0-35.0)   08/08/18  08:21    


 


MCHC  32.1 g/dL (31.0-37.0)   08/08/18  08:21    


 


RDW  13.0 % (11.5-15.5)   08/08/18  08:21    


 


Plt Count  442 k/uL (150-450)   08/08/18  08:21    


 


Neutrophils %  61 %  08/08/18  08:21    


 


Lymphocytes %  28 %  08/08/18  08:21    


 


Monocytes %  6 %  08/08/18  08:21    


 


Eosinophils %  2 %  08/08/18  08:21    


 


Basophils %  1 %  08/08/18  08:21    


 


Neutrophils #  5.1 k/uL (1.3-7.7)   08/08/18  08:21    


 


Lymphocytes #  2.4 k/uL (1.0-4.8)   08/08/18  08:21    


 


Monocytes #  0.5 k/uL (0-1.0)   08/08/18  08:21    


 


Eosinophils #  0.2 k/uL (0-0.7)   08/08/18  08:21    


 


Basophils #  0.0 k/uL (0-0.2)   08/08/18  08:21    


 


Sodium  140 mmol/L (137-145)   08/08/18  08:21    


 


Potassium  4.4 mmol/L (3.5-5.1)   08/08/18  08:21    


 


Chloride  105 mmol/L ()   08/08/18  08:21    


 


Carbon Dioxide  24 mmol/L (22-30)   08/08/18  08:21    


 


Anion Gap  11 mmol/L  08/08/18  08:21    


 


BUN  9 mg/dL (7-17)   08/08/18  08:21    


 


Creatinine  0.83 mg/dL (0.52-1.04)   08/08/18  08:21    


 


Est GFR (CKD-EPI)AfAm  >90  (>60 ml/min/1.73 sqM)   08/08/18  08:21    


 


Est GFR (CKD-EPI)NonAf  80  (>60 ml/min/1.73 sqM)   08/08/18  08:21    


 


Glucose  87 mg/dL (74-99)   08/08/18  08:21    


 


Estimated Ave Glu mg/dL  120   08/08/18  08:21    


 


Hemoglobin A1c  5.8 % (4.0-6.0)   08/08/18  08:21    


 


Calcium  10.0 mg/dL (8.4-10.2)   08/08/18  08:21    


 


Total Bilirubin  0.4 mg/dL (0.2-1.3)   08/08/18  08:21    


 


AST  30 U/L (14-36)   08/08/18  08:21    


 


ALT  26 U/L (9-52)   08/08/18  08:21    


 


Alkaline Phosphatase  78 U/L ()   08/08/18  08:21    


 


Total Protein  7.1 g/dL (6.3-8.2)   08/08/18  08:21    


 


Albumin  4.6 g/dL (3.5-5.0)   08/08/18  08:21    


 


Triglycerides  167 mg/dL (<150)  H  08/08/18  08:21    


 


Cholesterol  273 mg/dL (<200)  H  08/08/18  08:21    


 


LDL Cholesterol, Calc  179 mg/dL (0-99)  H  08/08/18  08:21    


 


HDL Cholesterol  61 mg/dL (40-60)  H  08/08/18  08:21    


 


TSH  2.870 mIU/L (0.465-4.680)   08/08/18  08:21    


 


Urine Color  Light Yellow   08/10/18  17:00    


 


Urine Appearance  Clear  (Clear)   08/10/18  17:00    


 


Urine pH  6.5  (5.0-8.0)   08/10/18  17:00    


 


Ur Specific Gravity  1.007  (1.001-1.035)   08/10/18  17:00    


 


Urine Protein  Negative  (Negative)   08/10/18  17:00    


 


Urine Glucose (UA)  Negative  (Negative)   08/10/18  17:00    


 


Urine Ketones  Negative  (Negative)   08/10/18  17:00    


 


Urine Blood  Negative  (Negative)   08/10/18  17:00    


 


Urine Nitrite  Negative  (Negative)   08/10/18  17:00    


 


Urine Bilirubin  Negative  (Negative)   08/10/18  17:00    


 


Urine Urobilinogen  <2.0 mg/dL (<2.0)   08/10/18  17:00    


 


Ur Leukocyte Esterase  Negative  (Negative)   08/10/18  17:00    


 


Urine HCG, Qual  Not Detected  (Not Detectd)   08/10/18  17:00    


 


Urine Opiates Screen  Not Detected  (NotDetected)   08/10/18  17:00    


 


Ur Oxycodone Screen  Not Detected  (NotDetected)   08/10/18  17:00    


 


Urine Methadone Screen  Not Detected  (NotDetected)   08/10/18  17:00    


 


Ur Propoxyphene Screen  Not Detected  (NotDetected)   08/10/18  17:00    


 


Ur Barbiturates Screen  Not Detected  (NotDetected)   08/10/18  17:00    


 


U Tricyclic Antidepress  Detected  (NotDetected)  H  08/10/18  17:00    


 


Ur Phencyclidine Scrn  Not Detected  (NotDetected)   08/10/18  17:00    


 


Ur Amphetamines Screen  Not Detected  (NotDetected)   08/10/18  17:00    


 


U Methamphetamines Scrn  Not Detected  (NotDetected)   08/10/18  17:00    


 


U Benzodiazepines Scrn  Detected  (NotDetected)  H  08/10/18  17:00    


 


Urine Cocaine Screen  Not Detected  (NotDetected)   08/10/18  17:00    


 


U Marijuana (THC) Screen  Not Detected  (NotDetected)   08/10/18  17:00    














Discharge Mental Status: Appearance/Attitude: Patient is casually dressed, 

makes good eye contact and was cooperative.


Behavior: Patient did not display any psychomotor agitation or retardation.


Speech/Language: Patient's speech was spontaneous of normal volume and rhythm 

and she was coherent.


Thought Process: Patient was goal-directed there is no evidence of loose 

association or flight of ideas


Thought Content: Patient denied any auditory or visual hallucinations and no 

delusions or paranoid ideation were elicited.  Patient stated that she was 

feeling more in control, stated that her thinking had cleared, her appetite had 

improved and her sleep was restful.  


Suicidal/Homicidal Ideation: Patient denied any current suicidal or homicidal 

ideation


Sensorium/Cognition: Patient was alert and oriented to person, place, and time 

and she states that her thinking was clear, her concentration and focus had 

improved and her recent and remote memory were grossly intact


Mood/Affect: Patient's mood was stable and her affect was appropriate


Insight/Judgment: Patient's insight and judgment were intact





Risk Assessment: Patient's risk for self harm is low should the patient 

remaining sober, follow-up with medication and appointments





Discharge Plan: Patient will return to her home, she will continue on her 

lisinopril, inhaler and Bentyl and patient will continue on Abilify 10 mg at 7 

PM and Lamictal 150 mg the morning 200 mg at bedtime.  Patient will receive 

prescriptions for Abilify, Lamictal and lisinopril sent to her pharmacy 

electronically.  Patient states she has sufficient of her other medications at 

home.  Patient stated that she does not need any tramadol.  Patient was advised 

to not continue to use Ativan at home.  Patient and I discussed good sleep 

hygiene and  the need for exercise.  Patient will also follow up at [facility] and was encouraged to keep her appointment and be 

compliant with her medication.  Patient was advised to avoid all alcohol and 

drugs.





Patient Condition at Discharge: Stable





Plan - Discharge Summary


Discharge Rx Participant: No


New Discharge Prescriptions: 


New


   ARIPiprazole [Abilify] 10 mg PO 1900 #14 tab


   Dicyclomine [Bentyl] 10 mg PO QID PRN  cap


     PRN Reason: Dyspepsia


   Lisinopril [Zestril] 10 mg PO DAILY #28 tab





Continue


   Polyethylene Glycol 3350 [Miralax] 17 gm PO DAILY





Changed


   lamoTRIgine [LaMICtal] 100 mg PO BID #49 tablet





Discontinued


   QUEtiapine FUMARATE [Seroquel Xr] 400 mg PO BID #60 tab.er.24h


   lamoTRIgine [LaMICtal] 150 mg PO QAM


   Lisinopril [Zestril] 10 mg PO DAILY


   LORazepam [Ativan] 1 mg PO TID PRN


     PRN Reason: Anxiety


   traMADol HCL [Ultram] 50 mg PO BID


   Venlafaxine HCl ER [Effexor Xr] 75 mg PO BID-W/MEALS


Discharge Medication List





Polyethylene Glycol 3350 [Miralax] 17 gm PO DAILY 03/10/17 [History]


ARIPiprazole [Abilify] 10 mg PO 1900 #14 tab 08/16/18 [Rx]


Dicyclomine [Bentyl] 10 mg PO QID PRN  cap 08/16/18 [Rx]


Lisinopril [Zestril] 10 mg PO DAILY #28 tab 08/16/18 [Rx]


lamoTRIgine [LaMICtal] 100 mg PO BID #49 tablet 08/16/18 [Rx]








Follow up Appointment(s)/Referral(s): 


Sunita Tucker [Outside] - 08/27/18 3:00 pm


([facility] at 300 on  8/27/18 


On cancellation list for earlier appointment if one becomes available)


Roselyn Carlos MD [Primary Care Provider] - 1-2 days


Patient Instructions/Handouts:  How to Stop Smoking (GEN), Brief Psychotic 

Disorder (GEN)


Activity/Diet/Wound Care/Special Instructions: 


Keep your follow appointments as scheduled.  Continue your medications as 

prescribed.  No alcohol or street drugs.  No access to guns or weapons.  Diet 

and activity as tolerated.  If you have any problems call the crisis line at 1- [phone].


Discharge Disposition: HOME SELF-CARE

## 2018-11-27 ENCOUNTER — HOSPITAL ENCOUNTER (INPATIENT)
Dept: HOSPITAL 47 - EC | Age: 58
LOS: 8 days | Discharge: HOME | DRG: 881 | End: 2018-12-05
Attending: PSYCHIATRY & NEUROLOGY | Admitting: PSYCHIATRY & NEUROLOGY
Payer: MEDICARE

## 2018-11-27 VITALS — BODY MASS INDEX: 27.6 KG/M2

## 2018-11-27 DIAGNOSIS — Z79.899: ICD-10-CM

## 2018-11-27 DIAGNOSIS — R25.1: ICD-10-CM

## 2018-11-27 DIAGNOSIS — H26.9: ICD-10-CM

## 2018-11-27 DIAGNOSIS — G47.00: ICD-10-CM

## 2018-11-27 DIAGNOSIS — Z82.5: ICD-10-CM

## 2018-11-27 DIAGNOSIS — Z81.1: ICD-10-CM

## 2018-11-27 DIAGNOSIS — R45.851: ICD-10-CM

## 2018-11-27 DIAGNOSIS — J44.9: ICD-10-CM

## 2018-11-27 DIAGNOSIS — G89.29: ICD-10-CM

## 2018-11-27 DIAGNOSIS — F17.200: ICD-10-CM

## 2018-11-27 DIAGNOSIS — Z90.710: ICD-10-CM

## 2018-11-27 DIAGNOSIS — F41.9: ICD-10-CM

## 2018-11-27 DIAGNOSIS — Z82.49: ICD-10-CM

## 2018-11-27 DIAGNOSIS — Z81.8: ICD-10-CM

## 2018-11-27 DIAGNOSIS — F60.9: ICD-10-CM

## 2018-11-27 DIAGNOSIS — F32.9: Primary | ICD-10-CM

## 2018-11-27 DIAGNOSIS — I10: ICD-10-CM

## 2018-11-27 DIAGNOSIS — K57.90: ICD-10-CM

## 2018-11-27 DIAGNOSIS — K58.9: ICD-10-CM

## 2018-11-27 DIAGNOSIS — M19.90: ICD-10-CM

## 2018-11-27 DIAGNOSIS — L65.9: ICD-10-CM

## 2018-11-27 DIAGNOSIS — M54.9: ICD-10-CM

## 2018-11-27 LAB
ALBUMIN SERPL-MCNC: 4.1 G/DL (ref 3.5–5)
ALP SERPL-CCNC: 65 U/L (ref 38–126)
ALT SERPL-CCNC: 19 U/L (ref 9–52)
ANION GAP SERPL CALC-SCNC: 8 MMOL/L
AST SERPL-CCNC: 28 U/L (ref 14–36)
BASOPHILS # BLD AUTO: 0 K/UL (ref 0–0.2)
BASOPHILS NFR BLD AUTO: 1 %
BUN SERPL-SCNC: 12 MG/DL (ref 7–17)
CALCIUM SPEC-MCNC: 10 MG/DL (ref 8.4–10.2)
CHLORIDE SERPL-SCNC: 107 MMOL/L (ref 98–107)
CO2 SERPL-SCNC: 24 MMOL/L (ref 22–30)
EOSINOPHIL # BLD AUTO: 0.4 K/UL (ref 0–0.7)
EOSINOPHIL NFR BLD AUTO: 4 %
ERYTHROCYTE [DISTWIDTH] IN BLOOD BY AUTOMATED COUNT: 4.23 M/UL (ref 3.8–5.4)
ERYTHROCYTE [DISTWIDTH] IN BLOOD: 13.4 % (ref 11.5–15.5)
GLUCOSE SERPL-MCNC: 98 MG/DL (ref 74–99)
HCT VFR BLD AUTO: 40.7 % (ref 34–46)
HGB BLD-MCNC: 12.8 GM/DL (ref 11.4–16)
LYMPHOCYTES # SPEC AUTO: 3.3 K/UL (ref 1–4.8)
LYMPHOCYTES NFR SPEC AUTO: 38 %
MCH RBC QN AUTO: 30.4 PG (ref 25–35)
MCHC RBC AUTO-ENTMCNC: 31.6 G/DL (ref 31–37)
MCV RBC AUTO: 96.2 FL (ref 80–100)
MONOCYTES # BLD AUTO: 0.6 K/UL (ref 0–1)
MONOCYTES NFR BLD AUTO: 7 %
NEUTROPHILS # BLD AUTO: 4.3 K/UL (ref 1.3–7.7)
NEUTROPHILS NFR BLD AUTO: 49 %
PLATELET # BLD AUTO: 418 K/UL (ref 150–450)
POTASSIUM SERPL-SCNC: 4.4 MMOL/L (ref 3.5–5.1)
PROT SERPL-MCNC: 6.8 G/DL (ref 6.3–8.2)
SODIUM SERPL-SCNC: 139 MMOL/L (ref 137–145)
WBC # BLD AUTO: 8.8 K/UL (ref 3.8–10.6)

## 2018-11-27 PROCEDURE — 82565 ASSAY OF CREATININE: CPT

## 2018-11-27 PROCEDURE — 84520 ASSAY OF UREA NITROGEN: CPT

## 2018-11-27 PROCEDURE — 80306 DRUG TEST PRSMV INSTRMNT: CPT

## 2018-11-27 PROCEDURE — 80061 LIPID PANEL: CPT

## 2018-11-27 PROCEDURE — 90686 IIV4 VACC NO PRSV 0.5 ML IM: CPT

## 2018-11-27 PROCEDURE — 81003 URINALYSIS AUTO W/O SCOPE: CPT

## 2018-11-27 PROCEDURE — 99285 EMERGENCY DEPT VISIT HI MDM: CPT

## 2018-11-27 PROCEDURE — 83036 HEMOGLOBIN GLYCOSYLATED A1C: CPT

## 2018-11-27 PROCEDURE — 82075 ASSAY OF BREATH ETHANOL: CPT

## 2018-11-27 PROCEDURE — 80178 ASSAY OF LITHIUM: CPT

## 2018-11-27 PROCEDURE — 80053 COMPREHEN METABOLIC PANEL: CPT

## 2018-11-27 PROCEDURE — 85025 COMPLETE CBC W/AUTO DIFF WBC: CPT

## 2018-11-27 PROCEDURE — 84443 ASSAY THYROID STIM HORMONE: CPT

## 2018-11-27 NOTE — ED
General Adult HPI





- General


Chief complaint: Psychiatric Symptoms


Stated complaint: suicidal


Time Seen by Provider: 18 08:01


Source: patient, EMS, RN notes reviewed


Mode of arrival: EMS


Limitations: physical limitation





- History of Present Illness


Initial comments: 





Patient is a 58-year-old female presented to the emergency room today with a 

chief complaint of suicidal ideation.  Patient states that she's been feeling 

depressed for last several months.  Has been followed up the family doctor.  

Patient states that symptoms do not seem to be improving.  She states that she'

s not had any specific plan of harming herself.  She denies any homicidal 

thoughts.  She denies any other complaints. Patient denies any recent fever, 

chills, shortness of breath, chest pain, back pain, abdominal pain, nausea or 

vomiting, numbness or tingling, headaches or visual changes, or any other 

complaints.





- Related Data


 Home Medications











 Medication  Instructions  Recorded  Confirmed


 


Cetirizine HCl 10 mg PO DAILY 18


 


Lurasidone [Latuda] 80 mg PO HS 18


 


Sertraline [Zoloft] 200 mg PO HS 18


 


busPIRone HCl [Buspar] 5 mg PO TID 18











 Allergies











Allergy/AdvReac Type Severity Reaction Status Date / Time


 


No Known Allergies Allergy   Verified 18 10:23














Review of Systems


ROS Statement: 


Those systems with pertinent positive or pertinent negative responses have been 

documented in the HPI.





ROS Other: All systems not noted in ROS Statement are negative.





Past Medical History


Past Medical History: GI Bleed, Hypertension, Osteoarthritis (OA)


Additional Past Medical History / Comment(s): GI bleed 2 requiring blood 

transfusion the last one in 2015, hypertension on diet control, 

alopecia, ibs, ulcerative colitis, diffuse diverticulosis, shingles that 

affected rt eye,cataracts. pt stated she ahd a pne vaccine in less than 5 years 

not sure ofdate and writer unabale to verify at time of admit.


History of Any Multi-Drug Resistant Organisms: None Reported


Past Surgical History: Hysterectomy, Orthopedic Surgery, Tonsillectomy, Tubal 

Ligation


Additional Past Surgical History / Comment(s): ACL repair on the right knee, 

right second finger  severed tendons repaired.tubal ligation


Past Anesthesia/Blood Transfusion Reactions: No Reported Reaction


Past Psychological History: Anxiety, Bipolar, Depression


Smoking Status: Current every day smoker


Past Alcohol Use History: None Reported


Past Drug Use History: None Reported





- Past Family History


  ** Father


Additional Family Medical History / Comment(s): Father  at 75 from COPD.





  ** Mother


Additional Family Medical History / Comment(s): Mother  at age 76 from 

heart failure and COPD





  ** Brother(s)


Additional Family Medical History / Comment(s): Patient has 1 brother with 

history of alcohol abuse currently sober.  Patient does not have any sisters.





General Exam





- General Exam Comments


Initial Comments: 





General:  The patient is awake and alert, in no distress, and does not appear 

acutely ill. 


Eye:   There is normal conjunctiva bilaterally.  No signs of icterus.  


Ears, nose, mouth and throat:  There are moist mucous membranes and no oral 

lesions. 


Neck:  The neck is supple, there is no tenderness or JVD.  


Cardiovascular:  There is a regular rate and rhythm. No murmur, rub or gallop 

is appreciated.


Respiratory:  Lungs are clear to auscultation, respirations are non-labored, 

breath sounds are equal.  No wheezes, stridor, rales, or rhonchi.


Musculoskeletal:  Normal ROM, no tenderness.   


Neurological:  A&O x 3. CN II-XII intact, There are no obvious motor or sensory 

deficits. Coordination appears grossly intact. Speech is normal.


Skin:  Skin is warm and dry and no rashes or lesions are noted. 


Psychiatric:  Cooperative.


Limitations: physical limitation





Course


 Vital Signs











  18





  08:07


 


Temperature 98.9 F


 


Pulse Rate 104 H


 


Respiratory 18





Rate 


 


Blood Pressure 152/97


 


O2 Sat by Pulse 94 L





Oximetry 














Medical Decision Making





- Medical Decision Making





Patient seen here by the [facility].  They recommended patient be admitted.  

Patient is willing to sign herself in.





- Lab Data


 Lab Results











  18 Range/Units





  08:25 


 


Urine Opiates Screen  Not Detected  (NotDetected)  


 


Ur Oxycodone Screen  Not Detected  (NotDetected)  


 


Urine Methadone Screen  Not Detected  (NotDetected)  


 


Ur Propoxyphene Screen  Not Detected  (NotDetected)  


 


Ur Barbiturates Screen  Not Detected  (NotDetected)  


 


U Tricyclic Antidepress  Not Detected  (NotDetected)  


 


Ur Phencyclidine Scrn  Not Detected  (NotDetected)  


 


Ur Amphetamines Screen  Not Detected  (NotDetected)  


 


U Methamphetamines Scrn  Not Detected  (NotDetected)  


 


U Benzodiazepines Scrn  Detected H  (NotDetected)  


 


Urine Cocaine Screen  Not Detected  (NotDetected)  


 


U Marijuana (THC) Screen  Not Detected  (NotDetected)  














Disposition


Clinical Impression: 


 Suicidal ideation





Disposition: TRANSFER TO PSYCH HOSP/UNIT


Condition: Stable


Is patient prescribed a controlled substance at d/c from ED?: No


Referrals: 


Roselyn Carlos MD [Primary Care Provider] - 1-2 days


Time of Disposition: 11:31

## 2018-11-28 LAB
CHOLEST SERPL-MCNC: 265 MG/DL (ref ?–200)
HBA1C MFR BLD: 5.5 % (ref 4–6)
HDLC SERPL-MCNC: 53 MG/DL (ref 40–60)
LDLC SERPL CALC-MCNC: 181 MG/DL (ref 0–99)
TRIGL SERPL-MCNC: 156 MG/DL (ref ?–150)

## 2018-11-28 RX ADMIN — NICOTINE SCH PATCH: 21 PATCH, EXTENDED RELEASE TRANSDERMAL at 08:54

## 2018-11-28 RX ADMIN — LORATADINE SCH MG: 10 TABLET ORAL at 08:54

## 2018-11-28 RX ADMIN — SERTRALINE HYDROCHLORIDE SCH MG: 100 TABLET ORAL at 08:54

## 2018-11-28 RX ADMIN — LURASIDONE HYDROCHLORIDE SCH MG: 80 TABLET, FILM COATED ORAL at 08:54
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
## 2018-11-29 RX ADMIN — LURASIDONE HYDROCHLORIDE SCH MG: 80 TABLET, FILM COATED ORAL at 08:50

## 2018-11-29 RX ADMIN — ACETAMINOPHEN PRN MG: 325 TABLET, FILM COATED ORAL at 21:04

## 2018-11-29 RX ADMIN — SERTRALINE HYDROCHLORIDE SCH MG: 100 TABLET ORAL at 08:51

## 2018-11-29 RX ADMIN — LITHIUM CARBONATE SCH MG: 300 CAPSULE, GELATIN COATED ORAL at 21:04

## 2018-11-29 RX ADMIN — NICOTINE SCH PATCH: 21 PATCH, EXTENDED RELEASE TRANSDERMAL at 08:50

## 2018-11-29 RX ADMIN — LORATADINE SCH MG: 10 TABLET ORAL at 08:50

## 2018-11-29 NOTE — P.PN
Progress Note - Text





Interval history: The patient is found in the hallway she follows me to an 

interview room.  She continues to describe a depressed mood with hopelessness 

thoughts.  She states she doesn't know how she can go on living the way she 

feels.  She indicates she did not sleep well staff recorded she slept 7 hours.  

Appetite stable.  We reviewed her current psychotropic medications and 

medication options.  We decided that we would augment with lithium and 

discussed the potential benefits and side effects of that medication.  We had 

considered using Trintellix to replace Zoloft but that would not be affordable 

as it would not be covered once discharged.





Mental status exam: The patient is an overweight  female appearing her 

stated age.  She is pleasant and cooperative and easily directed.  She is 

dressed in her own clothing she has a disheveled appearance hygiene is 

adequate.  She is wearing eyeglasses.  Speech is fluent spontaneous 

nonpressured.  She endorses a sad depressed mood with hopelessness thoughts.  

She is tearful during the session but reconstitutes.  She reports no homicidal 

ideation intent or plan.  She reports no auditory or visual hallucinations she 

endorses no specific delusions.  There is no observed evidence of psychosis.  

She demonstrates no tangential thinking loose associations or flight of ideas.  

She remains oriented to person place and date.  She demonstrates no verbal or 

physical aggressiveness she demonstrates no repetitive involuntary movements.  

Insight and judgment limited.





Plan: The patient's will continue on her current psychotropic medication 

however we will discontinue the BuSpar and augment with lithium carbonate 300 

mg in the evening.  Baseline labs are reviewed her BUN/creatinine TSH and 

sodium are all within normal limits.  Vital signs reviewed.  We will continue 

to monitor her for safety she is encouraged to fully participate in groups.

## 2018-11-30 RX ADMIN — LURASIDONE HYDROCHLORIDE SCH MG: 80 TABLET, FILM COATED ORAL at 08:47

## 2018-11-30 RX ADMIN — ACETAMINOPHEN PRN MG: 325 TABLET, FILM COATED ORAL at 15:32

## 2018-11-30 RX ADMIN — LORATADINE SCH MG: 10 TABLET ORAL at 08:47

## 2018-11-30 RX ADMIN — LITHIUM CARBONATE SCH MG: 300 CAPSULE, GELATIN COATED ORAL at 20:06

## 2018-11-30 RX ADMIN — SERTRALINE HYDROCHLORIDE SCH MG: 100 TABLET ORAL at 08:47

## 2018-11-30 RX ADMIN — NICOTINE SCH PATCH: 21 PATCH, EXTENDED RELEASE TRANSDERMAL at 08:47

## 2018-11-30 NOTE — P.PN
Progress Note - Text





Interval history: The patient is found at the  she follows me to an 

interview room.  She reports that she slept better last night staff recorded 

she slept 7 hours.  Appetite stable.  She selectively attend groups.  She 

describes herself as a shut-in at home and finds it challenging been around 

individuals here.  We discussed the importance of socializing and group 

participation.  We reviewed her psychotropic medication she has no questions or 

concerns at this time.  She feels safe in the hospital she is reporting no 

thoughts of harming herself here.  No physical complaints.





Mental status exam: The patient's is alert she is dressed in her own clothing 

hygiene grooming adequate.  Eye contact is appropriate.  She is pleasant and 

cooperative.  She indicates her mood is better today.  She reports feeling safe 

in the hospital.  She is reporting no homicidal ideation intent or plan.  She 

reports no auditory or visual hallucinations or any specific delusions.  There 

is no observed evidence of psychosis.  She does not appear hypomanic or manic.  

Insight and judgment limited.  She demonstrates no verbal or physical 

aggressiveness.  She demonstrates no involuntary repetitive movements.





Plan: The patient will continue on her current psychotropic medication.  We 

will monitor her for safety and encourage full participation in the milieu.  

Vital signs reviewed.  We will obtain a lithium level after the weekend.

## 2018-12-01 RX ADMIN — NICOTINE SCH PATCH: 21 PATCH, EXTENDED RELEASE TRANSDERMAL at 07:55

## 2018-12-01 RX ADMIN — LORATADINE SCH MG: 10 TABLET ORAL at 07:55

## 2018-12-01 RX ADMIN — MIRTAZAPINE SCH MG: 15 TABLET, FILM COATED ORAL at 21:39

## 2018-12-01 RX ADMIN — ACETAMINOPHEN PRN MG: 325 TABLET, FILM COATED ORAL at 05:36

## 2018-12-01 RX ADMIN — LURASIDONE HYDROCHLORIDE SCH MG: 80 TABLET, FILM COATED ORAL at 07:55

## 2018-12-01 RX ADMIN — LITHIUM CARBONATE SCH MG: 300 CAPSULE, GELATIN COATED ORAL at 20:07

## 2018-12-01 RX ADMIN — SERTRALINE HYDROCHLORIDE SCH MG: 100 TABLET ORAL at 07:55

## 2018-12-01 NOTE — P.PN
Progress Note - Text


Progress Note Date: 12/01/18





IDENTIFICATION DATA: 


57-year-old  female admitted to the mental health unit through the 

emergency room for acute symptoms of depression with hopelessness and suicidal 

ideation.





INTERVAL HISTORY:  


She reports her crying spells have improved.  She states she has slept only two 

hours last night. She states when she goes home she will be able to sleep 

better. She is currently wondering if she COULD  get something to help her with 

sleep. She states she hasnt been able to think well. She reports good appetite. 

She reports going to all her groups. No major behavioral problems reported. 





MENTAL STATUS EXAMINATION: 


Appeared stated age.  Fair grooming and hygiene. No abnormal movements noted.  

mood is reported as ok and affect appropriate.  speech and thought process was 

goal directed.  denies current auditory or visual hallucinations.   denies 

paranoia. is alert and oriented x 4. denies current suicidal or homicidal 

ideations. His insight and judgement are improving. 





ASSESSMENT AND PLAN: 


She has agreed to take remeron to help her with sleep. Will start 7,5mg of 

remeron at night.


Continue current treatment.


Monitor for symptoms.

## 2018-12-02 LAB
PH UR: 6 [PH] (ref 5–8)
SP GR UR: 1.02 (ref 1–1.03)
UROBILINOGEN UR QL STRIP: <2 MG/DL (ref ?–2)

## 2018-12-02 RX ADMIN — SERTRALINE HYDROCHLORIDE SCH MG: 100 TABLET ORAL at 08:05

## 2018-12-02 RX ADMIN — LORATADINE SCH MG: 10 TABLET ORAL at 08:05

## 2018-12-02 RX ADMIN — NICOTINE SCH PATCH: 21 PATCH, EXTENDED RELEASE TRANSDERMAL at 08:05

## 2018-12-02 RX ADMIN — LURASIDONE HYDROCHLORIDE SCH MG: 80 TABLET, FILM COATED ORAL at 08:05

## 2018-12-02 RX ADMIN — LITHIUM CARBONATE SCH MG: 300 CAPSULE, GELATIN COATED ORAL at 20:16

## 2018-12-02 RX ADMIN — MIRTAZAPINE SCH MG: 15 TABLET, FILM COATED ORAL at 20:17

## 2018-12-02 RX ADMIN — ACETAMINOPHEN PRN MG: 325 TABLET, FILM COATED ORAL at 12:59

## 2018-12-02 NOTE — P.PN
Progress Note - Text


Progress Note Date: 12/02/18





IDENTIFICATION DATA: 


57-year-old  female admitted to the mental health unit through the 

emergency room for acute symptoms of depression with hopelessness and suicidal 

ideation.





INTERVAL HISTORY:  


Patient states she slept like a baby yesterday.  She was wondering if she is 

going through panic attacks as she is experiencing shakiness and complains of 

hand tremors. Noted slight tremors of her right hand she sits with her hands 

closed in like fist in an attempt to control her tremors and is worried if they 

are due to her medications. Patient claims to have taken ativan earlier today 

hoping it will control her shakiness. She says she is used to taking ativan 

five times a day in the past for anxiety. Patient was advised to remove her 

nicotine patch at night and was also advised to limit her caffeinated beverages 

which can also worsen her tremulousness. Will obtain a lithium level tomorrow. 

Patient was also explained that it will take at least five days for lithium to 

attain steady state levels and she might notice some improvement and tolerance 

after that. She can also monitor a log when her tremors are getting worse. She 

claims having difficulty holding things and says she has spilled her coffee 

this morning due to her hand tremors. She says she is waiting to go home and 

currently feels safe going home. She no longer feels hopeless or sad. She 

denies current suicidal or homicidal ideations. She reports good appetite. No 

major behavioral problems REported.  


MENTAL STATUS EXAMINATION: 


Appeared stated age.  Fair grooming and hygiene. No abnormal movements noted.  

mood is reported as anxious about  her hand tremors and affect appropriate.  

speech and thought process was goal directed.  denies current auditory or 

visual hallucinations.   denies paranoia. is alert and oriented x 4. denies 

current suicidal or homicidal ideations. His insight and judgement are 

improving. 





ASSESSMENT AND PLAN: 


Continue current treatment.


Monitor for symptoms.


Obtain lithium level tomorrow as she complains of hand tremors and is anxious 

it might be medication induced. She was advised to remove her nicotine patch at 

night and was also advised to limit her caffeinated beverages which can also 

worsen her tremulousness. She was also reassured  and explained about the 

lithium metabolism.

## 2018-12-03 RX ADMIN — MIRTAZAPINE SCH MG: 15 TABLET, FILM COATED ORAL at 20:11

## 2018-12-03 RX ADMIN — LORATADINE SCH MG: 10 TABLET ORAL at 08:50

## 2018-12-03 RX ADMIN — SERTRALINE HYDROCHLORIDE SCH MG: 100 TABLET ORAL at 08:50

## 2018-12-03 RX ADMIN — LITHIUM CARBONATE SCH MG: 300 CAPSULE, GELATIN COATED ORAL at 20:11

## 2018-12-03 RX ADMIN — ACETAMINOPHEN PRN MG: 325 TABLET, FILM COATED ORAL at 10:19

## 2018-12-03 RX ADMIN — LURASIDONE HYDROCHLORIDE SCH MG: 80 TABLET, FILM COATED ORAL at 08:51

## 2018-12-03 RX ADMIN — NICOTINE SCH PATCH: 21 PATCH, EXTENDED RELEASE TRANSDERMAL at 08:50

## 2018-12-04 LAB
BUN SERPL-SCNC: 17 MG/DL (ref 7–17)
LITHIUM SERPL-MCNC: 0.3 MMOL/L

## 2018-12-04 RX ADMIN — MIRTAZAPINE SCH MG: 15 TABLET, FILM COATED ORAL at 21:26

## 2018-12-04 RX ADMIN — LURASIDONE HYDROCHLORIDE SCH MG: 80 TABLET, FILM COATED ORAL at 08:35

## 2018-12-04 RX ADMIN — NICOTINE SCH PATCH: 21 PATCH, EXTENDED RELEASE TRANSDERMAL at 08:35

## 2018-12-04 RX ADMIN — LORATADINE SCH MG: 10 TABLET ORAL at 08:35

## 2018-12-04 RX ADMIN — SERTRALINE HYDROCHLORIDE SCH MG: 100 TABLET ORAL at 08:35

## 2018-12-04 RX ADMIN — LITHIUM CARBONATE SCH MG: 300 CAPSULE, GELATIN COATED ORAL at 21:28

## 2018-12-04 RX ADMIN — ACETAMINOPHEN PRN MG: 325 TABLET, FILM COATED ORAL at 19:36

## 2018-12-04 NOTE — P.PN
Progress Note - Text





Interval history: The patient is found in group she follows me to an interview 

room.  She indicates her mood is awful today.  She reports not sleeping well 

last night staff reported she slept 6 hours.  Her appetite stable.  She has 

been attending groups.  We discussed that she is having some concern about 

being discharged.  She states "I just don't want the whole cycle to start over 

again".





Mental status exam: Initially the patient has a dysphoric affect as the 

interview progresses her affect brightens.  Eye contact is appropriate speech 

is fluent spontaneous nonpressured.  She is dressed in her own clothing hygiene 

is adequate she is mildly disheveled.  She feels safe in the hospital she is 

endorsing no acute suicidal or homicidal ideation intent or plan.  She reports 

having some hopeless thoughts this morning but feels they are improving 

already.  She endorses no auditory or visual hallucinations or any specific 

delusions.  There is no observed evidence of psychosis.  She does not 

demonstrate any tangential thinking loose associations or flight of ideas.  She 

does not appear hypomanic or manic.  There is no verbal or physical 

aggressiveness demonstrated there is no observed involuntary repetitive 

movements.





Plan: The patient continues to clinically stabilize we will anticipate a 

discharge tomorrow after her support meeting at 1 PM.  She will continue on her 

current psychotropic medications we are awaiting lab results regarding her 

lithium level.  We will continue to monitor her for safety she is encouraged to 

continue participating in the milieu

## 2018-12-05 VITALS — TEMPERATURE: 98.1 F | HEART RATE: 77 BPM | RESPIRATION RATE: 18 BRPM

## 2018-12-05 VITALS — SYSTOLIC BLOOD PRESSURE: 124 MMHG | DIASTOLIC BLOOD PRESSURE: 77 MMHG

## 2018-12-05 RX ADMIN — SERTRALINE HYDROCHLORIDE SCH MG: 100 TABLET ORAL at 08:36

## 2018-12-05 RX ADMIN — NICOTINE SCH PATCH: 21 PATCH, EXTENDED RELEASE TRANSDERMAL at 08:36

## 2018-12-05 RX ADMIN — LURASIDONE HYDROCHLORIDE SCH MG: 80 TABLET, FILM COATED ORAL at 08:36

## 2018-12-05 RX ADMIN — LORATADINE SCH MG: 10 TABLET ORAL at 08:36

## 2018-12-05 NOTE — P.DS
Providers


Date of admission: 


11/27/18 14:31





Expected date of discharge: 12/05/18


Attending physician: 


Tod Gurrola





Consults: 





 





11/27/18 15:06


Consult Physician Routine 


   Consulting Provider: Petr Green


   Consult Reason/Comments: H&P for mental admission


   Do you want consulting provider notified?: Yes











Primary care physician: 


Roselyn Carlos








- Discharge Diagnosis(es)


(1) Depression


Current Visit: No   Status: Acute   Priority: High   





(2) Anxiety


Current Visit: Yes   Status: Acute   Priority: Medium   


Hospital Course: 





Brief summary of admission note: This patient is a 58-year-old  female 

who was admitted to the mental health unit through the emergency room for acute 

symptoms of depression with hopelessness thinking and suicidal ideation.  The 

patient presented stating her mood was markedly depressed.  She indicated she 

did not want to live anymore and things were just too hard.  She describes 

having no energy to get out of bed she did not want to take care of herself.  

She reported excessive sleep up to 20 hours a day.  She reported tearfulness 

anhedonia and poor concentration.  Due to her excessive sleeping she had not 

been fully complying with her psychotropic medication.  For full details please 

refer to my psychiatric evaluation dated 11/28/2018.





Summary of hospital course: The patient was admitted to the mental health unit 

voluntarily.  We reviewed her presenting symptoms and treatment options.  We 

continued her Latuda and Zoloft.  The BuSpar was discontinued.  We decided to 

add lithium 300 mg at bedtime as an augmentation strategy for her depression 

and mood instability.  Labs were drawn yesterday her lithium level was 0.3 BUN/

creatinine creatinine remain within normal limits.  She participated in the 

milieu she reported a progressive improvement of symptoms while here.  She is 

clearly able to attend to her activities of daily living.  We discussed goals 

in terms of working with her individual therapist.  We identified personality 

disorder traits that contribute to her presentation here.  She was seen by 

internal medicine for routine history and physical exam.  Social work has met 

with her several times to complete a psychosocial assessment and for discharge 

planning purposes.  At this time the patient feels safe to return home and to 

continue her care as an outpatient.





Mental status exam: The patient is a  female appearing her stated age.

  Hygiene and grooming are adequate.  Eye contact is appropriate.  Speech is 

fluent spontaneous nonpressured.  She reports her mood is good today her affect 

is bright and congruent with reported mood.  She reports no hopelessness 

thinking and reports no suicidal ideation intent or plan.  She reports no 

auditory or visual hallucinations or any specific delusions.  There is no 

observed evidence of psychosis.  She demonstrates no circumstantial thinking 

tangential thinking loose associations or flight of ideas.  She does not appear 

hypomanic or manic.  She demonstrates no verbal or physical aggressiveness she 

demonstrates no involuntary repetitive movements.  Insight and judgment have 

improved.  She remains oriented to person place and date.





Impressions:


1.  Depression unspecified, rule out bipolar depression versus major depressive 

disorder, anxiety unspecified





Plan: The patient will be discharged from the mental health unit today to 

return home.  She will continue working with her outpatient psychiatrist Dr. Thorne and her established therapist.  She will be discharged on Zoloft 200 mg 

daily, Latuda 80 mg daily, Remeron 7.5 mg at bedtime, lithium 300 mg at 

bedtime.  The Remeron was started over this past weekend by a covering 

physician as the patient complained of insomnia.  She is instructed to 

discontinue the medication if she is able to sleep sufficiently at home as we 

would like to try to minimize the number of psychotropic medications she is 

receiving if possible.  There is no imminent safety risk the patient is 

appropriate for transition back to outpatient care.  She is instructed to 

return to the hospital any acute safety concerns.


Patient Condition at Discharge: Stable





Plan - Discharge Summary


Discharge Rx Participant: No


New Discharge Prescriptions: 


New


   Acetaminophen Tab [Tylenol] 650 mg PO Q4HR PRN #30 tab


     PRN Reason: Pain/Discomfort


   Lithium Carbonate 300 mg PO HS #30 cap


   Mirtazapine [Remeron] 7.5 mg PO HS #15 tab


   Nicotine 21Mg/24Hr Patch [Habitrol] 1 patch TRANSDERM DAILY #10 patch





Continue


   Cetirizine HCl 10 mg PO DAILY


   Lurasidone [Latuda] 80 mg PO HS #30 tab


   Sertraline [Zoloft] 200 mg PO HS #60 tab





Discontinued


   busPIRone HCl [Buspar] 5 mg PO TID


Discharge Medication List





Cetirizine HCl 10 mg PO DAILY 11/27/18 [History]


Acetaminophen Tab [Tylenol] 650 mg PO Q4HR PRN #30 tab 12/05/18 [Rx]


Lithium Carbonate 300 mg PO HS #30 cap 12/05/18 [Rx]


Lurasidone [Latuda] 80 mg PO HS #30 tab 12/05/18 [Rx]


Mirtazapine [Remeron] 7.5 mg PO HS #15 tab 12/05/18 [Rx]


Nicotine 21Mg/24Hr Patch [Habitrol] 1 patch TRANSDERM DAILY #10 patch 12/05/18 [

Rx]


Sertraline [Zoloft] 200 mg PO HS #60 tab 12/05/18 [Rx]








Follow up Appointment(s)/Referral(s): 


Psychiatry,[facility] [Other] - 12/11/18 1:20 pm (Dr Thorne )


Roselyn Carlos MD [Primary Care Provider] - 1-2 days


Patient Instructions/Handouts:  Bipolar Disorder (GEN), Depression (GEN), 

Suicide Prevention (GEN)


Activity/Diet/Wound Care/Special Instructions: 


Activity and diet as tolerated. Avoid the use of street drugs and alcohol. Take 

all medications as prescribed. When you are in need of refills on your 

medications please contact your medical provider and/or outpatient psychiatrist 

to have this done. Please go to scheduled outpatient appointment for aftercare 

treatment. If symptoms return or become worse, call the crisis line at [phone] and/or go to the nearest emergency room for evaluation.

## 2019-03-07 ENCOUNTER — HOSPITAL ENCOUNTER (OUTPATIENT)
Dept: HOSPITAL 47 - LABWHC1 | Age: 59
Discharge: HOME | End: 2019-03-07
Payer: MEDICARE

## 2019-03-07 DIAGNOSIS — I10: Primary | ICD-10-CM

## 2019-03-07 LAB
ALBUMIN SERPL-MCNC: 4.2 G/DL (ref 3.8–4.9)
ALBUMIN/GLOB SERPL: 2.63 G/DL (ref 1.6–3.17)
ALP SERPL-CCNC: 137 U/L (ref 41–126)
ALT SERPL-CCNC: 82 U/L (ref 8–44)
ANION GAP SERPL CALC-SCNC: 8.3 MMOL/L (ref 4–12)
AST SERPL-CCNC: 117 U/L (ref 13–35)
BUN SERPL-SCNC: 13 MG/DL (ref 9–27)
CALCIUM SPEC-MCNC: 9.6 MG/DL (ref 8.7–10.3)
CHLORIDE SERPL-SCNC: 105 MMOL/L (ref 96–109)
CHOLEST SERPL-MCNC: 197 MG/DL (ref 0–200)
CO2 SERPL-SCNC: 27.7 MMOL/L (ref 21.6–31.8)
GLOBULIN SER CALC-MCNC: 1.6 G/DL (ref 1.6–3.3)
GLUCOSE SERPL-MCNC: 105 MG/DL (ref 70–110)
HDLC SERPL-MCNC: 65 MG/DL (ref 40–60)
LDLC SERPL CALC-MCNC: 79.4 MG/DL (ref 0–131)
POTASSIUM SERPL-SCNC: 4.9 MMOL/L (ref 3.5–5.5)
PROT SERPL-MCNC: 5.8 G/DL (ref 6.2–8.2)
SODIUM SERPL-SCNC: 141 MMOL/L (ref 135–145)
TRIGL SERPL-MCNC: 263 MG/DL (ref 0–149)
VLDLC SERPL CALC-MCNC: 52.6 MG/DL (ref 5–40)

## 2019-03-07 PROCEDURE — 80053 COMPREHEN METABOLIC PANEL: CPT

## 2019-03-07 PROCEDURE — 80061 LIPID PANEL: CPT

## 2019-03-07 PROCEDURE — 36415 COLL VENOUS BLD VENIPUNCTURE: CPT

## 2019-03-07 PROCEDURE — 84443 ASSAY THYROID STIM HORMONE: CPT

## 2019-04-03 ENCOUNTER — HOSPITAL ENCOUNTER (OUTPATIENT)
Dept: HOSPITAL 47 - ORWHC2ENDO | Age: 59
Discharge: HOME | End: 2019-04-03
Attending: INTERNAL MEDICINE
Payer: MEDICARE

## 2019-04-03 VITALS — SYSTOLIC BLOOD PRESSURE: 125 MMHG | DIASTOLIC BLOOD PRESSURE: 80 MMHG | HEART RATE: 90 BPM

## 2019-04-03 VITALS — BODY MASS INDEX: 29.1 KG/M2

## 2019-04-03 VITALS — RESPIRATION RATE: 18 BRPM | TEMPERATURE: 98.4 F

## 2019-04-03 DIAGNOSIS — I10: ICD-10-CM

## 2019-04-03 DIAGNOSIS — Z79.899: ICD-10-CM

## 2019-04-03 DIAGNOSIS — Z79.51: ICD-10-CM

## 2019-04-03 DIAGNOSIS — K22.2: Primary | ICD-10-CM

## 2019-04-03 DIAGNOSIS — K21.9: ICD-10-CM

## 2019-04-03 DIAGNOSIS — Z87.891: ICD-10-CM

## 2019-04-03 DIAGNOSIS — Z87.19: ICD-10-CM

## 2019-04-03 DIAGNOSIS — K44.9: ICD-10-CM

## 2019-04-03 DIAGNOSIS — J44.9: ICD-10-CM

## 2019-04-03 DIAGNOSIS — K29.50: ICD-10-CM

## 2019-04-03 PROCEDURE — 88305 TISSUE EXAM BY PATHOLOGIST: CPT

## 2019-04-03 PROCEDURE — 43239 EGD BIOPSY SINGLE/MULTIPLE: CPT

## 2019-04-03 PROCEDURE — 43249 ESOPH EGD DILATION <30 MM: CPT

## 2019-04-03 NOTE — P.PCN
Date of Procedure: 04/03/19


Procedure(s) Performed: 


BRIEF HISTORY: Patient is a 58-year-old, pleasant, white female, scheduled for 

an upper endoscopy as a part of value should of intermittent dysphagia to solids

for the last few months duration.. 





PROCEDURE PERFORMED: Esophagogastroduodenoscopy with biopsy.





PREOPERATIVE DIAGNOSIS: Intermittent dysphagia to solids. 





IV sedation per anesthesia. 





PROCEDURE: After informed consent was obtained, the patient  was brought into 

the endoscopy unit. IV sedation was administered by Anesthesia under continuous 

monitoring. Initially the Olympus GIF-140 video endoscope was inserted into the 

mouth. Esophagus intubated without any difficulty. It was gradually advanced 

into the stomach and duodenum and carefully examined. The bulb and the second 

part of the duodenum appeared normal. The scope at this time was withdrawn to 

the stomach, adequately insufflated with air, and upon careful examination, 

mucosa of the antrum, patchy areas of erythema and biopsies were done from this 

area.  The body, cardia and the fundus appeared normal. The scope was then 

withdrawn into the esophagus.  Small sliding Hiatal hernia noted.  The GE 

junction was located at 39 cm from the incisors.  There was a distal esophageal 

Schatzki's ring identified which was dilated using 18-20 mm TTS balloon as sequ

ential fashion for 90 seconds.  The esophagus appeared normal. There were no 

erosions or ulcerations seen.  Biopsies were done from the distal esophagus and 

the patient tolerated the procedure well. 





IMPRESSION: 


1.  Distal esophageal Schatzki's ring status post balloon dilation using 18-20 

mm TTS balloon.


2.  Small hiatal hernia


3.  Mild antral gastritis.





RECOMMENDATIONS: The findings of this examination were discussed with the 

patient as well as a family.  She was advised to follow with the biopsy 

results..  She'll be seen in office in 3-4 weeks

## 2019-05-29 ENCOUNTER — HOSPITAL ENCOUNTER (INPATIENT)
Dept: HOSPITAL 47 - EC | Age: 59
LOS: 6 days | Discharge: HOME | DRG: 885 | End: 2019-06-04
Attending: PSYCHIATRY & NEUROLOGY | Admitting: PSYCHIATRY & NEUROLOGY
Payer: MEDICARE

## 2019-05-29 DIAGNOSIS — F33.2: Primary | ICD-10-CM

## 2019-05-29 DIAGNOSIS — I10: ICD-10-CM

## 2019-05-29 DIAGNOSIS — F60.9: ICD-10-CM

## 2019-05-29 DIAGNOSIS — F43.10: ICD-10-CM

## 2019-05-29 DIAGNOSIS — Z81.8: ICD-10-CM

## 2019-05-29 DIAGNOSIS — J44.9: ICD-10-CM

## 2019-05-29 DIAGNOSIS — K51.90: ICD-10-CM

## 2019-05-29 DIAGNOSIS — K21.9: ICD-10-CM

## 2019-05-29 DIAGNOSIS — Z91.5: ICD-10-CM

## 2019-05-29 DIAGNOSIS — F17.210: ICD-10-CM

## 2019-05-29 DIAGNOSIS — Z82.49: ICD-10-CM

## 2019-05-29 DIAGNOSIS — Z81.1: ICD-10-CM

## 2019-05-29 DIAGNOSIS — Z62.810: ICD-10-CM

## 2019-05-29 DIAGNOSIS — Z82.5: ICD-10-CM

## 2019-05-29 DIAGNOSIS — Z79.899: ICD-10-CM

## 2019-05-29 DIAGNOSIS — R45.851: ICD-10-CM

## 2019-05-29 DIAGNOSIS — Z91.410: ICD-10-CM

## 2019-05-29 LAB
PH UR: 5.5 [PH] (ref 5–8)
SP GR UR: 1.03 (ref 1–1.03)
UROBILINOGEN UR QL STRIP: 2 MG/DL (ref ?–2)

## 2019-05-29 PROCEDURE — 82248 BILIRUBIN DIRECT: CPT

## 2019-05-29 PROCEDURE — 80178 ASSAY OF LITHIUM: CPT

## 2019-05-29 PROCEDURE — 85027 COMPLETE CBC AUTOMATED: CPT

## 2019-05-29 PROCEDURE — 80061 LIPID PANEL: CPT

## 2019-05-29 PROCEDURE — 84520 ASSAY OF UREA NITROGEN: CPT

## 2019-05-29 PROCEDURE — 99285 EMERGENCY DEPT VISIT HI MDM: CPT

## 2019-05-29 PROCEDURE — 85025 COMPLETE CBC W/AUTO DIFF WBC: CPT

## 2019-05-29 PROCEDURE — 80306 DRUG TEST PRSMV INSTRMNT: CPT

## 2019-05-29 PROCEDURE — 80053 COMPREHEN METABOLIC PANEL: CPT

## 2019-05-29 PROCEDURE — 82075 ASSAY OF BREATH ETHANOL: CPT

## 2019-05-29 PROCEDURE — 82565 ASSAY OF CREATININE: CPT

## 2019-05-29 PROCEDURE — 81003 URINALYSIS AUTO W/O SCOPE: CPT

## 2019-05-29 PROCEDURE — 83036 HEMOGLOBIN GLYCOSYLATED A1C: CPT

## 2019-05-29 PROCEDURE — 84443 ASSAY THYROID STIM HORMONE: CPT

## 2019-05-29 RX ADMIN — NICOTINE SCH PATCH: 14 PATCH, EXTENDED RELEASE TRANSDERMAL at 18:05

## 2019-05-29 RX ADMIN — SERTRALINE HYDROCHLORIDE SCH MG: 100 TABLET ORAL at 22:06

## 2019-05-29 RX ADMIN — LITHIUM CARBONATE SCH MG: 300 CAPSULE, GELATIN COATED ORAL at 22:06

## 2019-05-29 RX ADMIN — GABAPENTIN SCH MG: 100 CAPSULE ORAL at 22:06

## 2019-05-29 RX ADMIN — LURASIDONE HYDROCHLORIDE SCH MG: 80 TABLET, FILM COATED ORAL at 22:06

## 2019-05-29 NOTE — ED
General Adult HPI





- General


Chief complaint: Psychiatric Symptoms


Stated complaint: MENTAL HEALTH


Time Seen by Provider: 19 13:56


Source: patient, EMS


Mode of arrival: EMS


Limitations: no limitations





- History of Present Illness


Initial comments: 





Dictation was produced using dragon dictation software. please excuse any 

grammatical, word or spelling errors. 





Chief Complaint: 58-year-old female past medical history of psychiatric disease 

presents with suicidal ideation.





History of Present Illness: She 8-year-old female she has past medical history 

of psychiatric disease she was at her psychiatrist's office for reevaluation.  

Patient states she's been feeling suicidal.  Over the last couple weeks she's 

been on medication to try and curb her suicidal ideation.  She was reevaluated 

by her psychiatrist told him to the emergency department for suicidal ideation. 

Patient states she wants to drive her car in front of a semitruck.  She states 

she thought about it on multiple occasions over having done it.  Other 

complaints at this time.








The ROS documented in this emergency department record has been reviewed and 

confirmed by me.  Those systems with pertinent positive or negative responses 

have been documented in the HPI.  All other systems are other negative and/or 

noncontributory.








PHYSICAL EXAM:


General Impression: Alert and oriented x3, not in acute distress


HEENT: Normocephalic atraumatic, extra-ocular movements intact, pupils equal and

reactive to light bilaterally, mucous membranes moist.


Cardiovascular: Heart regular rate and rhythm, S1&S2 audible, no murmurs, rubs 

or gallops


Chest: Lungs clear to auscultation bilaterally, no rhonchi, no wheeze, no rales


Abdomen: Bowel sounds present, abdomen soft, non-tender, non-distended, no 

organomegaly


Musculoskeletal: Pulses present and equal in all extremities, no peripheral 

edema


Motor:  no focal deficits noted


Neurological: CN II-XII grossly intact, no focal motor or sensory deficits noted


Skin: Intact with no visualized rashes


Psych: Normal affect and mood





ED course: 58-year-old female multiple comorbidities presents with suicidal 

ideation.  Patient has no medical complaints at this time patient breath alcohol

test is negative.  Vital signs upon arrival are within acceptable limits.  

Medically cleared for EPS evaluation.


She is seen and evaluated by EPS.  They recommend inpatient psychiatric 

admission.














- Related Data


                                Home Medications











 Medication  Instructions  Recorded  Confirmed


 


Gabapentin [Neurontin] 100 mg PO BID 19


 


LORazepam [Ativan] 1 mg PO TID PRN 19


 


Omeprazole [PriLOSEC] 20 mg PO AC-BRKFST 19


 


Sertraline [Zoloft] 100 mg PO BID 19


 


Lithium Carbonate 300 mg PO BID 19


 


Lurasidone [Latuda] 20 mg PO HS 19


 


traZODone  mg PO HS 19








                                  Previous Rx's











 Medication  Instructions  Recorded


 


Lurasidone [Latuda] 80 mg PO HS #30 tab 18











                                    Allergies











Allergy/AdvReac Type Severity Reaction Status Date / Time


 


No Known Allergies Allergy   Verified 19 14:23














Review of Systems


ROS Statement: 


Those systems with pertinent positive or pertinent negative responses have been 

documented in the HPI.





ROS Other: All systems not noted in ROS Statement are negative.





Past Medical History


Past Medical History: COPD, GI Bleed, Hypertension, Osteoarthritis (OA)


Additional Past Medical History / Comment(s): GI bleed 2 requiring blood 

transfusion the last one in 2015,  alopecia, ibs, ulcerative colitis, 

diffuse diverticulosis, shingles that affected rt eye,cataracts.


History of Any Multi-Drug Resistant Organisms: None Reported


Past Surgical History: Orthopedic Surgery, Tonsillectomy, Tubal Ligation


Additional Past Surgical History / Comment(s): ACL repair on the right knee, 

right second finger  severed tendons repaired.tubal ligation


Past Anesthesia/Blood Transfusion Reactions: No Reported Reaction


Past Psychological History: Anxiety, Bipolar, Depression, PTSD


Smoking Status: Current every day smoker


Past Alcohol Use History: None Reported


Past Drug Use History: None Reported





- Past Family History


  ** Father


Additional Family Medical History / Comment(s): Father  at 75 from COPD.





  ** Mother


Additional Family Medical History / Comment(s): Mother  at age 76 from heart

 failure and COPD





  ** Brother(s)


Additional Family Medical History / Comment(s): Patient has 1 brother with 

history of alcohol abuse currently sober.  Patient does not have any sisters.





General Exam


Limitations: no limitations





Course


                                   Vital Signs











  19





  13:51 14:31 14:33


 


Temperature 98.3 F  


 


Pulse Rate 139 H 106 H 


 


Respiratory 20 16 





Rate   


 


Blood Pressure 160/99  120/84


 


O2 Sat by Pulse 96 96 





Oximetry   














Medical Decision Making





- Lab Data


                                   Lab Results











  19 Range/Units





  15:38 


 


Urine Opiates Screen  Not Detected  (NotDetected)  


 


Ur Oxycodone Screen  Not Detected  (NotDetected)  


 


Urine Methadone Screen  Not Detected  (NotDetected)  


 


Ur Propoxyphene Screen  Not Detected  (NotDetected)  


 


Ur Barbiturates Screen  Not Detected  (NotDetected)  


 


U Tricyclic Antidepress  Detected H  (NotDetected)  


 


Ur Phencyclidine Scrn  Not Detected  (NotDetected)  


 


Ur Amphetamines Screen  Not Detected  (NotDetected)  


 


U Methamphetamines Scrn  Not Detected  (NotDetected)  


 


U Benzodiazepines Scrn  Detected H  (NotDetected)  


 


Urine Cocaine Screen  Not Detected  (NotDetected)  


 


U Marijuana (THC) Screen  Not Detected  (NotDetected)  














Disposition


Clinical Impression: 


 Suicidal behavior





Disposition: ADMITTED AS IP TO THIS HOSP


Condition: Fair


Referrals: 


Victor Manuel Clancy DO [Primary Care Provider] - 1-2 days


Decision Time: 16:35

## 2019-05-30 LAB
ALBUMIN SERPL-MCNC: 5 G/DL (ref 3.5–5)
ALP SERPL-CCNC: 73 U/L (ref 38–126)
ALT SERPL-CCNC: 19 U/L (ref 9–52)
ANION GAP SERPL CALC-SCNC: 9 MMOL/L
AST SERPL-CCNC: 29 U/L (ref 14–36)
BASOPHILS # BLD AUTO: 0.1 K/UL (ref 0–0.2)
BASOPHILS NFR BLD AUTO: 1 %
BILIRUB INDIRECT SERPL-MCNC: 0.2 MG/DL (ref 0–1.1)
BILIRUBIN DIRECT+TOT PNL SERPL-MCNC: 0.3 MG/DL (ref 0–0.2)
BUN SERPL-SCNC: 9 MG/DL (ref 7–17)
CALCIUM SPEC-MCNC: 10.5 MG/DL (ref 8.4–10.2)
CHLORIDE SERPL-SCNC: 105 MMOL/L (ref 98–107)
CHOLEST SERPL-MCNC: 291 MG/DL (ref ?–200)
CO2 SERPL-SCNC: 24 MMOL/L (ref 22–30)
EOSINOPHIL # BLD AUTO: 0.5 K/UL (ref 0–0.7)
EOSINOPHIL NFR BLD AUTO: 4 %
ERYTHROCYTE [DISTWIDTH] IN BLOOD BY AUTOMATED COUNT: 4.61 M/UL (ref 3.8–5.4)
ERYTHROCYTE [DISTWIDTH] IN BLOOD: 14.4 % (ref 11.5–15.5)
GLUCOSE SERPL-MCNC: 91 MG/DL (ref 74–99)
HBA1C MFR BLD: 5.6 % (ref 4–6)
HCT VFR BLD AUTO: 43.7 % (ref 34–46)
HDLC SERPL-MCNC: 56 MG/DL (ref 40–60)
HGB BLD-MCNC: 14.6 GM/DL (ref 11.4–16)
LDLC SERPL CALC-MCNC: 192 MG/DL (ref 0–99)
LYMPHOCYTES # SPEC AUTO: 2.6 K/UL (ref 1–4.8)
LYMPHOCYTES NFR SPEC AUTO: 20 %
MCH RBC QN AUTO: 31.5 PG (ref 25–35)
MCHC RBC AUTO-ENTMCNC: 33.3 G/DL (ref 31–37)
MCV RBC AUTO: 94.6 FL (ref 80–100)
MONOCYTES # BLD AUTO: 0.8 K/UL (ref 0–1)
MONOCYTES NFR BLD AUTO: 6 %
NEUTROPHILS # BLD AUTO: 9.4 K/UL (ref 1.3–7.7)
NEUTROPHILS NFR BLD AUTO: 70 %
PLATELET # BLD AUTO: 457 K/UL (ref 150–450)
POTASSIUM SERPL-SCNC: 4.6 MMOL/L (ref 3.5–5.1)
PROT SERPL-MCNC: 7.7 G/DL (ref 6.3–8.2)
SODIUM SERPL-SCNC: 138 MMOL/L (ref 137–145)
TRIGL SERPL-MCNC: 214 MG/DL (ref ?–150)
WBC # BLD AUTO: 13.5 K/UL (ref 3.8–10.6)

## 2019-05-30 RX ADMIN — SERTRALINE HYDROCHLORIDE SCH MG: 100 TABLET ORAL at 21:41

## 2019-05-30 RX ADMIN — LITHIUM CARBONATE SCH MG: 300 CAPSULE, GELATIN COATED ORAL at 09:18

## 2019-05-30 RX ADMIN — LITHIUM CARBONATE SCH MG: 300 CAPSULE, GELATIN COATED ORAL at 21:40

## 2019-05-30 RX ADMIN — NICOTINE SCH PATCH: 14 PATCH, EXTENDED RELEASE TRANSDERMAL at 09:18

## 2019-05-30 RX ADMIN — SERTRALINE HYDROCHLORIDE SCH MG: 100 TABLET ORAL at 09:18

## 2019-05-30 RX ADMIN — ATORVASTATIN CALCIUM SCH MG: 40 TABLET, FILM COATED ORAL at 21:40

## 2019-05-30 RX ADMIN — BUDESONIDE SCH: 0.5 INHALANT ORAL at 22:12

## 2019-05-30 RX ADMIN — PANTOPRAZOLE SODIUM SCH MG: 40 TABLET, DELAYED RELEASE ORAL at 09:18

## 2019-05-30 RX ADMIN — GABAPENTIN SCH MG: 100 CAPSULE ORAL at 09:18

## 2019-05-30 RX ADMIN — LURASIDONE HYDROCHLORIDE SCH MG: 80 TABLET, FILM COATED ORAL at 21:40
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
## 2019-05-31 LAB
ERYTHROCYTE [DISTWIDTH] IN BLOOD BY AUTOMATED COUNT: 4.28 M/UL (ref 3.8–5.4)
ERYTHROCYTE [DISTWIDTH] IN BLOOD: 14 % (ref 11.5–15.5)
HCT VFR BLD AUTO: 40.5 % (ref 34–46)
HGB BLD-MCNC: 13.1 GM/DL (ref 11.4–16)
MCH RBC QN AUTO: 30.6 PG (ref 25–35)
MCHC RBC AUTO-ENTMCNC: 32.3 G/DL (ref 31–37)
MCV RBC AUTO: 94.7 FL (ref 80–100)
PLATELET # BLD AUTO: 421 K/UL (ref 150–450)
WBC # BLD AUTO: 10.2 K/UL (ref 3.8–10.6)

## 2019-05-31 RX ADMIN — NICOTINE SCH PATCH: 14 PATCH, EXTENDED RELEASE TRANSDERMAL at 09:29

## 2019-05-31 RX ADMIN — SERTRALINE HYDROCHLORIDE SCH MG: 100 TABLET ORAL at 19:57

## 2019-05-31 RX ADMIN — LITHIUM CARBONATE SCH MG: 300 CAPSULE, GELATIN COATED ORAL at 09:28

## 2019-05-31 RX ADMIN — PANTOPRAZOLE SODIUM SCH MG: 40 TABLET, DELAYED RELEASE ORAL at 07:49

## 2019-05-31 RX ADMIN — SERTRALINE HYDROCHLORIDE SCH MG: 100 TABLET ORAL at 09:28

## 2019-05-31 RX ADMIN — BUDESONIDE SCH: 0.5 INHALANT ORAL at 20:00

## 2019-05-31 RX ADMIN — LITHIUM CARBONATE SCH MG: 300 CAPSULE, GELATIN COATED ORAL at 19:57

## 2019-05-31 RX ADMIN — BUDESONIDE SCH: 0.5 INHALANT ORAL at 09:45

## 2019-05-31 RX ADMIN — LURASIDONE HYDROCHLORIDE SCH MG: 80 TABLET, FILM COATED ORAL at 19:57

## 2019-05-31 RX ADMIN — ATORVASTATIN CALCIUM SCH MG: 40 TABLET, FILM COATED ORAL at 19:57

## 2019-05-31 NOTE — P.PN
Progress Note - Text





Interval history: The patient is found in her room she follows me to an 

interview room.  She states that her mood is more depressed today.  She states 

that she was tearful yesterday.  One of her concerns continues to be that she 

feels unproductive at home.  We spent some time discussing opportunities she 

might seek out to avoid feelings of stagnation which will precipitate 

depression.  She indicated that she has been attending groups.  She has no 

questions or concerns regarding medication at this time.





Mental status exam: The patient is alert she is pleasant cooperative.  She is 

mildly disheveled hygiene is adequate she is dressed in her own clothing.  She 

describes a depressed mood she initially appears dysphoric.  Speech is fluent 

spontaneous nonpressured.  She reports ongoing hopeless thoughts.  In terms of 

suicidal ideation she feels safe in the hospital.  She is reporting no thoughts 

of harming others.  She endorses no auditory or visual hallucinations or 

specific delusions.  She demonstrates no verbal or physical aggressiveness no 

involuntary repetitive movements.  She maintains a dysphoric affect.





Plan: The patient will continue on her current psychotropic medication.  She is 

challenged to continue thinking of strategies to occupy her time once 

discharged.  She is encouraged to continue working on other coping skill 

development with group activities.  Vital signs reviewed.  We will continue to 

monitor her for safety.

## 2019-06-01 RX ADMIN — PANTOPRAZOLE SODIUM SCH MG: 40 TABLET, DELAYED RELEASE ORAL at 07:50

## 2019-06-01 RX ADMIN — LURASIDONE HYDROCHLORIDE SCH MG: 80 TABLET, FILM COATED ORAL at 21:19

## 2019-06-01 RX ADMIN — NICOTINE SCH PATCH: 14 PATCH, EXTENDED RELEASE TRANSDERMAL at 09:52

## 2019-06-01 RX ADMIN — ATORVASTATIN CALCIUM SCH MG: 40 TABLET, FILM COATED ORAL at 21:19

## 2019-06-01 RX ADMIN — LITHIUM CARBONATE SCH MG: 300 CAPSULE, GELATIN COATED ORAL at 09:52

## 2019-06-01 RX ADMIN — BUDESONIDE SCH: 0.5 INHALANT ORAL at 21:00

## 2019-06-01 RX ADMIN — BUDESONIDE SCH: 0.5 INHALANT ORAL at 11:03

## 2019-06-01 RX ADMIN — SERTRALINE HYDROCHLORIDE SCH MG: 100 TABLET ORAL at 09:52

## 2019-06-01 RX ADMIN — LITHIUM CARBONATE SCH MG: 300 CAPSULE, GELATIN COATED ORAL at 21:19

## 2019-06-01 RX ADMIN — SERTRALINE HYDROCHLORIDE SCH MG: 100 TABLET ORAL at 21:20

## 2019-06-01 NOTE — P.PN
Progress Note - Text


Progress Note Date: 06/01/19





IDENTIFICATION DATA: 


This patient is a 58-year-old  female was admitted to the mental health

unit through the emergency room for symptoms of depression with suicidal 

ideation.


INTERVAL HISTORY:  


Patient reports feeling sad about her current living situation. She claims maico

yeyo to nurses on the unit have given her some ideas and hope. She reports to 

have slept well yesterday. She reports to have been eating well here and claims 

to have lost weight due to not eating well prior to her admission. She reports 

to have walked out of the group this morning as she was unable to keep up with 

the fast pace of questions being asked about family [employer] game show. 


MENTAL STATUS EXAMINATION: 


Patient appears  stated age in fair grooming and hygiene.  maintains good eye 

contact. No abnormal movements noted.  speech and thought process are linear and

goal directed.  mood is reported as sad and affect constricted. Denies current  

auditory or visual hallucinations . denies paranoid ideations. The patient is 

alert and oriented 4 and in no apparent distress.  Denies  suicidal or 

homicidal ideation. insight and judgment are improving


ASSESSMENT 


Major depressive disorder recurrent severe without psychosis 


PLAN: 


continue on Lithium, latuda, zoloft and trazadone.

## 2019-06-02 VITALS — RESPIRATION RATE: 14 BRPM

## 2019-06-02 RX ADMIN — NICOTINE SCH PATCH: 14 PATCH, EXTENDED RELEASE TRANSDERMAL at 09:09

## 2019-06-02 RX ADMIN — BUDESONIDE SCH: 0.5 INHALANT ORAL at 10:09

## 2019-06-02 RX ADMIN — BUDESONIDE SCH: 0.5 INHALANT ORAL at 20:27

## 2019-06-02 RX ADMIN — SERTRALINE HYDROCHLORIDE SCH MG: 100 TABLET ORAL at 09:09

## 2019-06-02 RX ADMIN — LURASIDONE HYDROCHLORIDE SCH MG: 80 TABLET, FILM COATED ORAL at 21:27

## 2019-06-02 RX ADMIN — LITHIUM CARBONATE SCH MG: 300 CAPSULE, GELATIN COATED ORAL at 21:27

## 2019-06-02 RX ADMIN — PANTOPRAZOLE SODIUM SCH MG: 40 TABLET, DELAYED RELEASE ORAL at 09:09

## 2019-06-02 RX ADMIN — ATORVASTATIN CALCIUM SCH MG: 40 TABLET, FILM COATED ORAL at 21:27

## 2019-06-02 RX ADMIN — SERTRALINE HYDROCHLORIDE SCH MG: 100 TABLET ORAL at 21:27

## 2019-06-02 RX ADMIN — LITHIUM CARBONATE SCH MG: 300 CAPSULE, GELATIN COATED ORAL at 09:09

## 2019-06-02 NOTE — P.PN
Progress Note - Text


Progress Note Date: 06/02/19





IDENTIFICATION DATA: 


This patient is a 58-year-old  female was admitted to the mental health

unit through the emergency room for symptoms of depression with suicidal 

ideation.


INTERVAL HISTORY:  


Patient reports feeling sad and hopeless about life in general and says if she 

goes home she might do something to herself. She reports no motivation and 

reports spending most of the time crying. She reports suicidal ideations with no

current active plan. She reports good sleep with trazadone. She reports good 

appetite.  Reports feeling that others talking about her all the time. 


MENTAL STATUS EXAMINATION: 


Patient appears  stated age in fair grooming and hygiene.  maintains good eye 

contact. No abnormal movements noted.  speech and thought process are linear and

goal directed.  mood is reported as sad and affect constricted. Reports  

auditory hallucinations. Denies  visual hallucinations .Reports  paranoid 

ideations. The patient is alert and oriented 4.  Denies  suicidal or homicidal 

ideation. insight and judgment are limited


ASSESSMENT 


Major depressive disorder recurrent severe without psychosis 


PLAN: 


Will increase morning dose of Zoloft to 125mg po daily, continue evening dose of

zoloft


continue on Lithium, latuda, and trazadone.

## 2019-06-03 RX ADMIN — ATORVASTATIN CALCIUM SCH MG: 40 TABLET, FILM COATED ORAL at 20:44

## 2019-06-03 RX ADMIN — SERTRALINE HYDROCHLORIDE SCH MG: 100 TABLET ORAL at 08:11

## 2019-06-03 RX ADMIN — LITHIUM CARBONATE SCH MG: 300 CAPSULE, GELATIN COATED ORAL at 20:44

## 2019-06-03 RX ADMIN — LURASIDONE HYDROCHLORIDE SCH MG: 80 TABLET, FILM COATED ORAL at 20:44

## 2019-06-03 RX ADMIN — SERTRALINE HYDROCHLORIDE SCH MG: 100 TABLET ORAL at 20:44

## 2019-06-03 RX ADMIN — LITHIUM CARBONATE SCH MG: 300 CAPSULE, GELATIN COATED ORAL at 08:11

## 2019-06-03 RX ADMIN — BUDESONIDE SCH: 0.5 INHALANT ORAL at 09:26

## 2019-06-03 RX ADMIN — PANTOPRAZOLE SODIUM SCH MG: 40 TABLET, DELAYED RELEASE ORAL at 08:11

## 2019-06-03 RX ADMIN — NICOTINE SCH PATCH: 14 PATCH, EXTENDED RELEASE TRANSDERMAL at 08:11

## 2019-06-03 NOTE — P.PN
Progress Note - Text





Interval history: The patient's is found in the hallway she follows me to an 

interview room.  She indicates her mood is still down.  She states that her 

suicidal thoughts are transitioning from active to passive.  She has been 

attending groups.  Appetite stable.  We reviewed her psychotropic medication.  

She is compliant with medication.  In terms of discharge planning was discussed 

the possibility of having her attend the partial hospital program at [facility]

and she is amenable to considering that as an option.





Mental status exam: The patient is alert she presents with adequate hygiene 

grooming.  She is pleasant and cooperative.  She describes an ongoing 

chronically depressed mood.  She reports some passive suicidal ideation states 

she is experiencing no acute suicidal ideation intent or plan.  She is concerned

that this could transition back again if she is discharged to the same home 

environment and level of support.  She demonstrates no verbal or physical 

aggressiveness she demonstrates no evidence of psychosis hypomania or dedrick.  

Insight and judgment slowly improving.  She is oriented to person place and 

date.





Plan: The patient's will continue on her current psychotropic medication.  We 

will draw a lithium level tomorrow morning.  We will explore her option of 

attending [facility] hospital at [facility] if it's covered by her insurance.  

Vital signs reviewed.

## 2019-06-04 VITALS — SYSTOLIC BLOOD PRESSURE: 129 MMHG | HEART RATE: 81 BPM | DIASTOLIC BLOOD PRESSURE: 80 MMHG | TEMPERATURE: 97.6 F

## 2019-06-04 LAB
BUN SERPL-SCNC: 9 MG/DL (ref 7–17)
LITHIUM SERPL-MCNC: 0.5 MMOL/L

## 2019-06-04 RX ADMIN — SERTRALINE HYDROCHLORIDE SCH MG: 100 TABLET ORAL at 08:25

## 2019-06-04 RX ADMIN — BUDESONIDE SCH: 0.5 INHALANT ORAL at 08:25

## 2019-06-04 RX ADMIN — LITHIUM CARBONATE SCH MG: 300 CAPSULE, GELATIN COATED ORAL at 08:25

## 2019-06-04 RX ADMIN — NICOTINE SCH PATCH: 14 PATCH, EXTENDED RELEASE TRANSDERMAL at 08:24

## 2019-06-04 RX ADMIN — PANTOPRAZOLE SODIUM SCH MG: 40 TABLET, DELAYED RELEASE ORAL at 08:24

## 2019-06-04 RX ADMIN — BUDESONIDE SCH: 0.5 INHALANT ORAL at 01:37

## 2019-10-06 ENCOUNTER — HOSPITAL ENCOUNTER (EMERGENCY)
Dept: HOSPITAL 47 - EC | Age: 59
Discharge: HOME | End: 2019-10-06
Payer: MEDICARE

## 2019-10-06 VITALS — SYSTOLIC BLOOD PRESSURE: 136 MMHG | HEART RATE: 80 BPM | DIASTOLIC BLOOD PRESSURE: 72 MMHG | RESPIRATION RATE: 18 BRPM

## 2019-10-06 VITALS — TEMPERATURE: 98.6 F

## 2019-10-06 DIAGNOSIS — Z79.51: ICD-10-CM

## 2019-10-06 DIAGNOSIS — Z91.5: ICD-10-CM

## 2019-10-06 DIAGNOSIS — Z00.8: Primary | ICD-10-CM

## 2019-10-06 DIAGNOSIS — F31.9: ICD-10-CM

## 2019-10-06 DIAGNOSIS — Z87.19: ICD-10-CM

## 2019-10-06 DIAGNOSIS — Z79.899: ICD-10-CM

## 2019-10-06 DIAGNOSIS — J44.9: ICD-10-CM

## 2019-10-06 DIAGNOSIS — F17.200: ICD-10-CM

## 2019-10-06 LAB
ALBUMIN SERPL-MCNC: 4 G/DL (ref 3.5–5)
ALP SERPL-CCNC: 67 U/L (ref 38–126)
ALT SERPL-CCNC: 11 U/L (ref 9–52)
ANION GAP SERPL CALC-SCNC: 6 MMOL/L
APAP SPEC-MCNC: <10 UG/ML
AST SERPL-CCNC: 22 U/L (ref 14–36)
BASOPHILS # BLD AUTO: 0.2 K/UL (ref 0–0.2)
BASOPHILS NFR BLD AUTO: 1 %
BUN SERPL-SCNC: 6 MG/DL (ref 7–17)
CALCIUM SPEC-MCNC: 9.5 MG/DL (ref 8.4–10.2)
CHLORIDE SERPL-SCNC: 108 MMOL/L (ref 98–107)
CO2 SERPL-SCNC: 24 MMOL/L (ref 22–30)
EOSINOPHIL # BLD AUTO: 0.4 K/UL (ref 0–0.7)
EOSINOPHIL NFR BLD AUTO: 3 %
ERYTHROCYTE [DISTWIDTH] IN BLOOD BY AUTOMATED COUNT: 3.83 M/UL (ref 3.8–5.4)
ERYTHROCYTE [DISTWIDTH] IN BLOOD: 13.1 % (ref 11.5–15.5)
GLUCOSE SERPL-MCNC: 96 MG/DL (ref 74–99)
HCT VFR BLD AUTO: 37.4 % (ref 34–46)
HGB BLD-MCNC: 12.4 GM/DL (ref 11.4–16)
LITHIUM SERPL-MCNC: 0.2 MMOL/L
LYMPHOCYTES # SPEC AUTO: 1.9 K/UL (ref 1–4.8)
LYMPHOCYTES NFR SPEC AUTO: 17 %
MCH RBC QN AUTO: 32.3 PG (ref 25–35)
MCHC RBC AUTO-ENTMCNC: 33 G/DL (ref 31–37)
MCV RBC AUTO: 97.7 FL (ref 80–100)
MONOCYTES # BLD AUTO: 0.6 K/UL (ref 0–1)
MONOCYTES NFR BLD AUTO: 6 %
NEUTROPHILS # BLD AUTO: 8.1 K/UL (ref 1.3–7.7)
NEUTROPHILS NFR BLD AUTO: 71 %
PH UR: 7 [PH] (ref 5–8)
PLATELET # BLD AUTO: 469 K/UL (ref 150–450)
POTASSIUM SERPL-SCNC: 4.2 MMOL/L (ref 3.5–5.1)
PROT SERPL-MCNC: 6.6 G/DL (ref 6.3–8.2)
SALICYLATES SERPL-MCNC: <1 MG/DL
SODIUM SERPL-SCNC: 138 MMOL/L (ref 137–145)
SP GR UR: 1.01 (ref 1–1.03)
UROBILINOGEN UR QL STRIP: <2 MG/DL (ref ?–2)
WBC # BLD AUTO: 11.3 K/UL (ref 3.8–10.6)

## 2019-10-06 PROCEDURE — 80306 DRUG TEST PRSMV INSTRMNT: CPT

## 2019-10-06 PROCEDURE — 81003 URINALYSIS AUTO W/O SCOPE: CPT

## 2019-10-06 PROCEDURE — 36415 COLL VENOUS BLD VENIPUNCTURE: CPT

## 2019-10-06 PROCEDURE — 80320 DRUG SCREEN QUANTALCOHOLS: CPT

## 2019-10-06 PROCEDURE — 80178 ASSAY OF LITHIUM: CPT

## 2019-10-06 PROCEDURE — 85025 COMPLETE CBC W/AUTO DIFF WBC: CPT

## 2019-10-06 PROCEDURE — 82075 ASSAY OF BREATH ETHANOL: CPT

## 2019-10-06 PROCEDURE — 93005 ELECTROCARDIOGRAM TRACING: CPT

## 2019-10-06 PROCEDURE — 70450 CT HEAD/BRAIN W/O DYE: CPT

## 2019-10-06 PROCEDURE — 83520 IMMUNOASSAY QUANT NOS NONAB: CPT

## 2019-10-06 PROCEDURE — 80329 ANALGESICS NON-OPIOID 1 OR 2: CPT

## 2019-10-06 PROCEDURE — 99285 EMERGENCY DEPT VISIT HI MDM: CPT

## 2019-10-06 PROCEDURE — 80053 COMPREHEN METABOLIC PANEL: CPT

## 2019-10-06 PROCEDURE — 71046 X-RAY EXAM CHEST 2 VIEWS: CPT

## 2019-10-06 NOTE — CT
EXAMINATION TYPE: CT brain wo con

 

DATE OF EXAM: 10/6/2019

 

COMPARISON: 1/30/2016

 

HISTORY: AMS, confusion

 

CT DLP: 1143.4 mGycm

Automated exposure control for dose reduction was used.

 

FINDINGS: 

There is mild cerebral atrophy. There is no mass effect nor midline shift. There is no sign of intrac
ranial hemorrhage. The calvarium is intact.

 

IMPRESSION: 

MILD ATROPHY. NO ACUTE INTRACRANIAL ABNORMALITY. NO ADVERSE CHANGE COMPARED TO OLD EXAM.

## 2019-10-06 NOTE — XR
EXAMINATION TYPE: XR chest 2V

 

DATE OF EXAM: 10/6/2019

 

COMPARISON: 1/30/2016

 

HISTORY: Altered mental status

 

TECHNIQUE:  Frontal and lateral views of the chest are obtained.

 

FINDINGS:  There is some linear density at the left lung base. Heart size is slightly enlarged. There
 are no hilar masses. There is no pleural effusion.

 

IMPRESSION:  Mild cardiomegaly. Minimal subsegmental atelectasis left lower lobe. This appears new co
mpared to old exam. No heart failure.
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
## 2020-04-01 ENCOUNTER — HOSPITAL ENCOUNTER (INPATIENT)
Dept: HOSPITAL 47 - EC | Age: 60
LOS: 2 days | Discharge: HOME | DRG: 871 | End: 2020-04-03
Attending: HOSPITALIST | Admitting: HOSPITALIST
Payer: MEDICARE

## 2020-04-01 DIAGNOSIS — F43.10: ICD-10-CM

## 2020-04-01 DIAGNOSIS — T23.001A: ICD-10-CM

## 2020-04-01 DIAGNOSIS — F12.11: ICD-10-CM

## 2020-04-01 DIAGNOSIS — K57.30: ICD-10-CM

## 2020-04-01 DIAGNOSIS — I10: ICD-10-CM

## 2020-04-01 DIAGNOSIS — Z98.51: ICD-10-CM

## 2020-04-01 DIAGNOSIS — F10.21: ICD-10-CM

## 2020-04-01 DIAGNOSIS — H26.9: ICD-10-CM

## 2020-04-01 DIAGNOSIS — A41.50: Primary | ICD-10-CM

## 2020-04-01 DIAGNOSIS — Z91.5: ICD-10-CM

## 2020-04-01 DIAGNOSIS — N39.0: ICD-10-CM

## 2020-04-01 DIAGNOSIS — F31.9: ICD-10-CM

## 2020-04-01 DIAGNOSIS — F63.1: ICD-10-CM

## 2020-04-01 DIAGNOSIS — J44.9: ICD-10-CM

## 2020-04-01 DIAGNOSIS — Z82.5: ICD-10-CM

## 2020-04-01 DIAGNOSIS — Z87.19: ICD-10-CM

## 2020-04-01 DIAGNOSIS — Z81.1: ICD-10-CM

## 2020-04-01 DIAGNOSIS — M84.48XA: ICD-10-CM

## 2020-04-01 DIAGNOSIS — K51.90: ICD-10-CM

## 2020-04-01 DIAGNOSIS — Z56.0: ICD-10-CM

## 2020-04-01 DIAGNOSIS — T59.811A: ICD-10-CM

## 2020-04-01 DIAGNOSIS — Z60.2: ICD-10-CM
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
## 2020-04-01 DIAGNOSIS — E87.2: ICD-10-CM

## 2020-04-01 DIAGNOSIS — Z99.81: ICD-10-CM

## 2020-04-01 DIAGNOSIS — F17.210: ICD-10-CM

## 2020-04-01 DIAGNOSIS — F15.11: ICD-10-CM

## 2020-04-01 DIAGNOSIS — J70.5: ICD-10-CM

## 2020-04-01 DIAGNOSIS — Z82.49: ICD-10-CM
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
## 2020-04-01 DIAGNOSIS — S30.811A: ICD-10-CM

## 2020-04-01 DIAGNOSIS — Z79.899: ICD-10-CM

## 2020-04-01 DIAGNOSIS — S22.32XA: ICD-10-CM

## 2020-04-01 DIAGNOSIS — G93.41: ICD-10-CM

## 2020-04-01 LAB
ALBUMIN SERPL-MCNC: 4.6 G/DL (ref 3.5–5)
ALP SERPL-CCNC: 104 U/L (ref 38–126)
ALT SERPL-CCNC: 40 U/L (ref 4–34)
ANION GAP SERPL CALC-SCNC: 10 MMOL/L
AST SERPL-CCNC: 32 U/L (ref 14–36)
BASOPHILS # BLD AUTO: 0.1 K/UL (ref 0–0.2)
BASOPHILS NFR BLD AUTO: 0 %
BUN SERPL-SCNC: 13 MG/DL (ref 7–17)
CALCIUM SPEC-MCNC: 10.3 MG/DL (ref 8.4–10.2)
CHLORIDE SERPL-SCNC: 101 MMOL/L (ref 98–107)
CO2 SERPL-SCNC: 23 MMOL/L (ref 22–30)
EOSINOPHIL # BLD AUTO: 0.3 K/UL (ref 0–0.7)
EOSINOPHIL NFR BLD AUTO: 2 %
ERYTHROCYTE [DISTWIDTH] IN BLOOD BY AUTOMATED COUNT: 4.21 M/UL (ref 3.8–5.4)
ERYTHROCYTE [DISTWIDTH] IN BLOOD: 12.7 % (ref 11.5–15.5)
GLUCOSE SERPL-MCNC: 99 MG/DL (ref 74–99)
HCO3 BLDV-SCNC: 25 MMOL/L (ref 24–28)
HCT VFR BLD AUTO: 41 % (ref 34–46)
HGB BLD-MCNC: 13 GM/DL (ref 11.4–16)
LYMPHOCYTES # SPEC AUTO: 1.6 K/UL (ref 1–4.8)
LYMPHOCYTES NFR SPEC AUTO: 8 %
MCH RBC QN AUTO: 30.9 PG (ref 25–35)
MCHC RBC AUTO-ENTMCNC: 31.8 G/DL (ref 31–37)
MCV RBC AUTO: 97.4 FL (ref 80–100)
MONOCYTES # BLD AUTO: 1.6 K/UL (ref 0–1)
MONOCYTES NFR BLD AUTO: 8 %
NEUTROPHILS # BLD AUTO: 17.2 K/UL (ref 1.3–7.7)
NEUTROPHILS NFR BLD AUTO: 82 %
PCO2 BLDV: 48 MMHG (ref 37–51)
PH BLDV: 7.33 [PH] (ref 7.31–7.41)
PH UR: 7.5 [PH] (ref 5–8)
PLATELET # BLD AUTO: 606 K/UL (ref 150–450)
POTASSIUM SERPL-SCNC: 4.7 MMOL/L (ref 3.5–5.1)
PROT SERPL-MCNC: 7.6 G/DL (ref 6.3–8.2)
RBC UR QL: 9 /HPF (ref 0–5)
SODIUM SERPL-SCNC: 134 MMOL/L (ref 137–145)
SP GR UR: 1.02 (ref 1–1.03)
SQUAMOUS UR QL AUTO: 15 /HPF (ref 0–4)
UROBILINOGEN UR QL STRIP: <2 MG/DL (ref ?–2)
WBC # BLD AUTO: 21 K/UL (ref 3.8–10.6)
WBC #/AREA URNS HPF: 10 /HPF (ref 0–5)

## 2020-04-01 PROCEDURE — 96361 HYDRATE IV INFUSION ADD-ON: CPT

## 2020-04-01 PROCEDURE — 72128 CT CHEST SPINE W/O DYE: CPT

## 2020-04-01 PROCEDURE — 74176 CT ABD & PELVIS W/O CONTRAST: CPT

## 2020-04-01 PROCEDURE — 36415 COLL VENOUS BLD VENIPUNCTURE: CPT

## 2020-04-01 PROCEDURE — 70450 CT HEAD/BRAIN W/O DYE: CPT

## 2020-04-01 PROCEDURE — 82803 BLOOD GASES ANY COMBINATION: CPT

## 2020-04-01 PROCEDURE — 87086 URINE CULTURE/COLONY COUNT: CPT

## 2020-04-01 PROCEDURE — 96365 THER/PROPH/DIAG IV INF INIT: CPT

## 2020-04-01 PROCEDURE — 83605 ASSAY OF LACTIC ACID: CPT

## 2020-04-01 PROCEDURE — 81001 URINALYSIS AUTO W/SCOPE: CPT

## 2020-04-01 PROCEDURE — 90715 TDAP VACCINE 7 YRS/> IM: CPT

## 2020-04-01 PROCEDURE — 85025 COMPLETE CBC W/AUTO DIFF WBC: CPT

## 2020-04-01 PROCEDURE — 72125 CT NECK SPINE W/O DYE: CPT

## 2020-04-01 PROCEDURE — 87186 SC STD MICRODIL/AGAR DIL: CPT

## 2020-04-01 PROCEDURE — 82375 ASSAY CARBOXYHB QUANT: CPT

## 2020-04-01 PROCEDURE — 99291 CRITICAL CARE FIRST HOUR: CPT

## 2020-04-01 PROCEDURE — 71250 CT THORAX DX C-: CPT

## 2020-04-01 PROCEDURE — 84484 ASSAY OF TROPONIN QUANT: CPT

## 2020-04-01 PROCEDURE — 87077 CULTURE AEROBIC IDENTIFY: CPT

## 2020-04-01 PROCEDURE — 80320 DRUG SCREEN QUANTALCOHOLS: CPT

## 2020-04-01 PROCEDURE — 90471 IMMUNIZATION ADMIN: CPT

## 2020-04-01 PROCEDURE — 80306 DRUG TEST PRSMV INSTRMNT: CPT

## 2020-04-01 PROCEDURE — 96375 TX/PRO/DX INJ NEW DRUG ADDON: CPT

## 2020-04-01 PROCEDURE — 80053 COMPREHEN METABOLIC PANEL: CPT

## 2020-04-01 RX ADMIN — LITHIUM CARBONATE SCH MG: 150 CAPSULE, GELATIN COATED ORAL at 17:04

## 2020-04-01 RX ADMIN — ACETAMINOPHEN PRN MG: 325 TABLET, FILM COATED ORAL at 23:25

## 2020-04-01 RX ADMIN — ACETAMINOPHEN PRN MG: 325 TABLET, FILM COATED ORAL at 17:04

## 2020-04-01 RX ADMIN — ATORVASTATIN CALCIUM SCH MG: 40 TABLET, FILM COATED ORAL at 20:59

## 2020-04-01 RX ADMIN — CEFAZOLIN SCH: 330 INJECTION, POWDER, FOR SOLUTION INTRAMUSCULAR; INTRAVENOUS at 17:04

## 2020-04-01 RX ADMIN — LITHIUM CARBONATE SCH MG: 150 CAPSULE, GELATIN COATED ORAL at 21:00

## 2020-04-01 RX ADMIN — ACETAMINOPHEN PRN MG: 325 TABLET, FILM COATED ORAL at 12:24

## 2020-04-01 RX ADMIN — CEFAZOLIN SCH MLS/HR: 330 INJECTION, POWDER, FOR SOLUTION INTRAMUSCULAR; INTRAVENOUS at 04:00

## 2020-04-01 SDOH — ECONOMIC STABILITY - INCOME SECURITY: UNEMPLOYMENT, UNSPECIFIED: Z56.0

## 2020-04-01 SDOH — SOCIAL STABILITY - SOCIAL INSECURITY: PROBLEMS RELATED TO LIVING ALONE: Z60.2
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
## 2020-04-01 NOTE — CT
EXAMINATION TYPE: CT ChestAbdPelvis wo con

 

DATE OF EXAM: 4/1/2020

 

COMPARISON: None

 

HISTORY: Trauma pain

 

CT DLP: 1104.4 mGycm

Automated exposure control for dose reduction was used.

 

Exam was performed from the thoracic inlet to the floor the pelvis with no contrast.

 

The lungs are clear of consolidation. There is mild atelectasis at the lung bases bilaterally. Heart 
size is normal. There is no pericardial effusion. There is no mediastinal adenopathy. There is mild a
neurysm of the ascending aorta measures 4 cm. There are no hilar masses. There is no pleural effusion
. There is no pneumothorax.

 

Liver spleen pancreas gallbladder appear normal. Bile ducts are not dilated. There is no adrenal mass
. Stomach is intact. Kidneys show normal size and contour. There is no hydronephrosis. There is no re
troperitoneal adenopathy. Bladder distends smoothly. There is no inguinal hernia. There is no free fl
uid in the pelvis. Thoracic and lumbar vertebra have normal alignment. There is slight depression of 
the superior endplate of T4 vertebra that could be an acute 5% compression fracture. The sternum is i
ntact. The bony pelvis appears intact. There is no lumbar paraspinal mass. There is nondisplaced frac
ture left posterior 11th rib. The other ribs appear intact. Shoulder joints are not well evaluated. L
eft shoulder appears intact. Right shoulder articulation is not well seen.

 

There is some fat stranding and edema around the splenic flexure of the colon. There is splenic flexu
re multiple diverticula. There are numerous diverticula in the sigmoid colon.

 

IMPRESSION:

Acute fracture left 11th rib.

 

Diverticulitis of the splenic flexure of the colon. Numerous left-sided colonic diverticula.

 

4 cm aneurysm ascending aorta.  atelectasis left and right posterior lung bases. Atelectasis more on 
the left side.

 

Mild compression fractures of T4 could be an acute fracture.

## 2020-04-01 NOTE — CT
EXAMINATION TYPE: CT brain denisse wo con

 

DATE OF EXAM: 4/1/2020

 

COMPARISON: CT brain 10/6/2019

 

HISTORY: Trauma

Neck pain. Headache.

CT DLP: 1342.8 mGycm

Automated exposure control for dose reduction was used.

 

There is mild cerebral atrophy. There is no mass effect nor midline shift. There is no sign of intrac
ranial hemorrhage. Calvarium is intact. There is no evidence of cerebral edema.

 

Cervical vertebra have normal alignment. There is degenerative disc space narrowing at C5-6 and C6-7 
with spurring of the endplates. There is hypertrophic cervical facet arthropathy in the lower and mid
 cervical spine. I see no fracture. There is a very minimal C7-T1 3 mm spondylolisthesis. The skull b
ase is intact.

 

IMPRESSION:

Spondylotic changes in the cervical spine. No fracture.

 

Mild cerebral atrophy. No acute intracranial abnormality. No change compared to old exam.
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
## 2020-04-01 NOTE — CT
EXAMINATION TYPE: CT thoracic spine wo con

 

DATE OF EXAM: 4/1/2020

 

COMPARISON: None

 

HISTORY: Trauma

Back pain

CT DLP: 1104.4 mGycm

Automated exposure control for dose reduction was used.

 

Images were obtained from the level of lower cervical spine to L1 with no contrast.

 

There is 5% depression superior endplate of T4 vertebra that could be an acute fracture. The other th
oracic vertebra appear intact. There is normal alignment. There is no paraspinal mass. The posterior 
elements are intact.

 

IMPRESSION:

There is  compression fracture superior endplate of T4. Fracture probably acute.
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
## 2020-04-02 VITALS — RESPIRATION RATE: 16 BRPM

## 2020-04-02 LAB
ALBUMIN SERPL-MCNC: 3.8 G/DL (ref 3.5–5)
ALP SERPL-CCNC: 96 U/L (ref 38–126)
ALT SERPL-CCNC: 28 U/L (ref 4–34)
ANION GAP SERPL CALC-SCNC: 6 MMOL/L
AST SERPL-CCNC: 23 U/L (ref 14–36)
BASOPHILS # BLD AUTO: 0 K/UL (ref 0–0.2)
BASOPHILS NFR BLD AUTO: 0 %
BUN SERPL-SCNC: 7 MG/DL (ref 7–17)
CALCIUM SPEC-MCNC: 9.8 MG/DL (ref 8.4–10.2)
CHLORIDE SERPL-SCNC: 105 MMOL/L (ref 98–107)
CO2 SERPL-SCNC: 24 MMOL/L (ref 22–30)
EOSINOPHIL # BLD AUTO: 0.4 K/UL (ref 0–0.7)
EOSINOPHIL NFR BLD AUTO: 3 %
ERYTHROCYTE [DISTWIDTH] IN BLOOD BY AUTOMATED COUNT: 3.77 M/UL (ref 3.8–5.4)
ERYTHROCYTE [DISTWIDTH] IN BLOOD: 12.7 % (ref 11.5–15.5)
GLUCOSE SERPL-MCNC: 102 MG/DL (ref 74–99)
HCT VFR BLD AUTO: 36.9 % (ref 34–46)
HGB BLD-MCNC: 11.5 GM/DL (ref 11.4–16)
LYMPHOCYTES # SPEC AUTO: 1.8 K/UL (ref 1–4.8)
LYMPHOCYTES NFR SPEC AUTO: 14 %
MCH RBC QN AUTO: 30.6 PG (ref 25–35)
MCHC RBC AUTO-ENTMCNC: 31.3 G/DL (ref 31–37)
MCV RBC AUTO: 97.9 FL (ref 80–100)
MONOCYTES # BLD AUTO: 1 K/UL (ref 0–1)
MONOCYTES NFR BLD AUTO: 8 %
NEUTROPHILS # BLD AUTO: 9.8 K/UL (ref 1.3–7.7)
NEUTROPHILS NFR BLD AUTO: 73 %
PLATELET # BLD AUTO: 511 K/UL (ref 150–450)
POTASSIUM SERPL-SCNC: 4.2 MMOL/L (ref 3.5–5.1)
PROT SERPL-MCNC: 6.6 G/DL (ref 6.3–8.2)
SODIUM SERPL-SCNC: 135 MMOL/L (ref 137–145)
WBC # BLD AUTO: 13.4 K/UL (ref 3.8–10.6)

## 2020-04-02 RX ADMIN — ACETAMINOPHEN PRN MG: 325 TABLET, FILM COATED ORAL at 21:25

## 2020-04-02 RX ADMIN — LITHIUM CARBONATE SCH MG: 150 CAPSULE, GELATIN COATED ORAL at 21:16

## 2020-04-02 RX ADMIN — ACETAMINOPHEN PRN MG: 325 TABLET, FILM COATED ORAL at 07:48

## 2020-04-02 RX ADMIN — LITHIUM CARBONATE SCH MG: 150 CAPSULE, GELATIN COATED ORAL at 07:49

## 2020-04-02 RX ADMIN — ATORVASTATIN CALCIUM SCH MG: 40 TABLET, FILM COATED ORAL at 21:16

## 2020-04-02 RX ADMIN — CEFAZOLIN SCH MLS/HR: 330 INJECTION, POWDER, FOR SOLUTION INTRAMUSCULAR; INTRAVENOUS at 21:15

## 2020-04-02 RX ADMIN — CEFAZOLIN SCH: 330 INJECTION, POWDER, FOR SOLUTION INTRAMUSCULAR; INTRAVENOUS at 07:49

## 2020-04-02 RX ADMIN — ACETAMINOPHEN PRN MG: 325 TABLET, FILM COATED ORAL at 14:23
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
## 2020-04-02 NOTE — P.PN
Subjective


Progress Note Date: 04/02/20





Patient is seen and examined at bedside, patient has been transferred to my 

service  after being cleared by trauma surgery .  She feels much better today as

reports mild pain, blood urine culture is still pending.she remains afebrile 

with leukocytosis improving.  Of note UDS was positive for TCAs and geovanni

odiazepines  





Objective





- Vital Signs


Vital signs: 


                                   Vital Signs











Temp  98.1 F   04/02/20 04:12


 


Pulse  89   04/02/20 04:12


 


Resp  16   04/02/20 04:12


 


BP  111/77   04/02/20 04:12


 


Pulse Ox  92 L  04/02/20 04:12








                                 Intake & Output











 04/01/20 04/02/20 04/02/20





 18:59 06:59 18:59


 


Intake Total 540  


 


Balance 540  


 


Intake:   


 


  Oral 540  


 


Other:   


 


  Voiding Method  Toilet 


 


  # Voids 3 4 














- Exam





Constitutional: No acute distress, conversant, pleasant





Eyes: Anicteric sclerae, moist conjunctiva, no lid-lag, PERRLA





ENMT: NC/AT,Oropharynx clear, no erythema, exudates





Neck:Supple, FROM, no masses, or JVD, No carotid bruits; No thyromegaly





Lungs: Clear to auscultation, Clear to percussion, Normal respiratory effort, no

accessory muscle use 





Cardiovascular: Heart regular in rate and rhythm,  No murmurs, gallops, or rubs 

no peripheral edema





Abdominal: Soft Nontender, nom distended, no guarding, no rebound or  rigidity, 

Normoactive bowel sounds No hepatomegaly, No splenomegaly,  No palpable mass No 

abdominal wall hernia noted 





Skin: Normal temperature, tone, texture, turgor, No induration No subcutaneous 

nodules, No rash, lesions, No ulcers





Extremities:No digital cyanosis No clubbing, Pedal pulses intact and  

symmetrical Radial pulses intact and symmetrical Normal gait and station, No 

calf tenderness


 


Psychiatric: Alert and oriented to person, place and time, Appropriate affect 

Intact judgement   


      


Neuro: Muscles Strength 5/5 in all 4 extremities, Sensation to light touch 

grossly present throughout, Cranial nerves II-XII grossly intact. No focal 

sensory deficits








- Labs


CBC & Chem 7: 


                                 04/02/20 06:51





                                 04/02/20 06:51


Labs: 


                  Abnormal Lab Results - Last 24 Hours (Table)











  04/02/20 04/02/20 Range/Units





  06:51 06:51 


 


WBC  13.4 H   (3.8-10.6)  k/uL


 


RBC  3.77 L   (3.80-5.40)  m/uL


 


Plt Count  511 H   (150-450)  k/uL


 


Neutrophils #  9.8 H   (1.3-7.7)  k/uL


 


Sodium   135 L  (137-145)  mmol/L


 


Glucose   102 H  (74-99)  mg/dL








                      Microbiology - Last 24 Hours (Table)











 04/01/20 01:58 Urine Culture - Preliminary





 Urine,Clean Catch 














Assessment and Plan


Assessment: 





Metabolic encephalopathy


* Likely confusion in the setting of acute infection


* CT of the head showed mild cerebral atrophy no acute intracranial pathology





Acute diverticulitis


* We'll switch from IV antibiotics to oral Flagyl and Levaquin 





Urinary tract infection


* We'll send urine for culture


* Continue empiric treatment





T4 fracture


* Likely more chronic versus acute we'll follow-up more thorough consultation


* Pain management per trauma team

## 2020-04-03 VITALS — SYSTOLIC BLOOD PRESSURE: 121 MMHG | DIASTOLIC BLOOD PRESSURE: 82 MMHG | HEART RATE: 80 BPM | TEMPERATURE: 98 F

## 2020-04-03 LAB
ALBUMIN SERPL-MCNC: 3.6 G/DL (ref 3.5–5)
ALP SERPL-CCNC: 79 U/L (ref 38–126)
ALT SERPL-CCNC: 22 U/L (ref 4–34)
ANION GAP SERPL CALC-SCNC: 6 MMOL/L
AST SERPL-CCNC: 18 U/L (ref 14–36)
BASOPHILS # BLD AUTO: 0 K/UL (ref 0–0.2)
BASOPHILS NFR BLD AUTO: 0 %
BUN SERPL-SCNC: 11 MG/DL (ref 7–17)
CALCIUM SPEC-MCNC: 9.5 MG/DL (ref 8.4–10.2)
CHLORIDE SERPL-SCNC: 107 MMOL/L (ref 98–107)
CO2 SERPL-SCNC: 25 MMOL/L (ref 22–30)
EOSINOPHIL # BLD AUTO: 0.5 K/UL (ref 0–0.7)
EOSINOPHIL NFR BLD AUTO: 6 %
ERYTHROCYTE [DISTWIDTH] IN BLOOD BY AUTOMATED COUNT: 3.7 M/UL (ref 3.8–5.4)
ERYTHROCYTE [DISTWIDTH] IN BLOOD: 12.6 % (ref 11.5–15.5)
GLUCOSE SERPL-MCNC: 101 MG/DL (ref 74–99)
HCT VFR BLD AUTO: 36.3 % (ref 34–46)
HGB BLD-MCNC: 11.2 GM/DL (ref 11.4–16)
LYMPHOCYTES # SPEC AUTO: 1.5 K/UL (ref 1–4.8)
LYMPHOCYTES NFR SPEC AUTO: 18 %
MCH RBC QN AUTO: 30.3 PG (ref 25–35)
MCHC RBC AUTO-ENTMCNC: 30.9 G/DL (ref 31–37)
MCV RBC AUTO: 98.1 FL (ref 80–100)
MONOCYTES # BLD AUTO: 0.6 K/UL (ref 0–1)
MONOCYTES NFR BLD AUTO: 7 %
NEUTROPHILS # BLD AUTO: 5.4 K/UL (ref 1.3–7.7)
NEUTROPHILS NFR BLD AUTO: 66 %
PLATELET # BLD AUTO: 504 K/UL (ref 150–450)
POTASSIUM SERPL-SCNC: 3.8 MMOL/L (ref 3.5–5.1)
PROT SERPL-MCNC: 6.4 G/DL (ref 6.3–8.2)
SODIUM SERPL-SCNC: 138 MMOL/L (ref 137–145)
WBC # BLD AUTO: 8.2 K/UL (ref 3.8–10.6)

## 2020-04-03 RX ADMIN — ACETAMINOPHEN PRN MG: 325 TABLET, FILM COATED ORAL at 09:12

## 2020-04-03 RX ADMIN — ACETAMINOPHEN PRN MG: 325 TABLET, FILM COATED ORAL at 15:49

## 2020-04-03 RX ADMIN — LITHIUM CARBONATE SCH MG: 150 CAPSULE, GELATIN COATED ORAL at 09:13

## 2020-04-03 RX ADMIN — CEFAZOLIN SCH: 330 INJECTION, POWDER, FOR SOLUTION INTRAMUSCULAR; INTRAVENOUS at 09:11
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
## 2024-05-01 NOTE — P.PN
Orthopaedics Daily Progress Note                            Date of Surgery:  4/30/2024    Patient: Kumar Elena   YOB: 1946  Age: 77 y.o.      SUBJECTIVE:   1 Day Post-Op following LEFT TOTAL KNEE ARTHROPLASTY.      The patient's post operative pain is controlled.  No CP/SOB.  No N/V. The patient's mobility will be evaluated today during PT sessions.    OBJECTIVE:     Vital Signs:    BP 90/70   Pulse 59   Temp 99.1 °F (37.3 °C)   Resp 16   SpO2 94%     Physical Exam:  General: A&Ox3. The patient is cooperative, and in no acute distress.    Respiratory: Respirations are unlabored.  Surgical site(s): dressing clean, dry  Musculoskeletal: Calves are soft, supple, and non-tender upon palpation.  Motor 5/5.  Neurological:  Neurovascularly intact with good dorsi and plantar flexion.    Pulses symmetrical.    Laboratory Values:             Recent Results (from the past 12 hour(s))   Basic Metabolic Panel    Collection Time: 05/01/24  5:28 AM   Result Value Ref Range    Sodium 138 136 - 145 mmol/L    Potassium 4.7 3.5 - 5.1 mmol/L    Chloride 110 (H) 97 - 108 mmol/L    CO2 23 21 - 32 mmol/L    Anion Gap 5 5 - 15 mmol/L    Glucose 104 (H) 65 - 100 mg/dL    BUN 18 6 - 20 MG/DL    Creatinine 1.09 0.70 - 1.30 MG/DL    Bun/Cre Ratio 17 12 - 20      Est, Glom Filt Rate 71 >60 ml/min/1.73m2    Calcium 8.3 (L) 8.5 - 10.1 MG/DL   Hemoglobin and Hematocrit    Collection Time: 05/01/24  5:28 AM   Result Value Ref Range    Hemoglobin 11.6 (L) 12.1 - 17.0 g/dL    Hematocrit 36.1 (L) 36.6 - 50.3 %         PLAN:     S/P LEFT TOTAL KNEE ARTHROPLASTY -Continue WBAT.  -Mobilize and continue with PT/OT until discharged     Hemodynamics Hgb today is 11.6.  Acute blood loss anemia as expected. Patient asymptomatic.  Continue to monitor.     Wound Monitor postop dressing; no postop dressing changes necessary.  Reinforce PRN.     Post Operative Pain Pain Control: stable, mild-to-moderate joint symptoms intermittently,  Progress Note - Text





Interval history: The patient is found in the hallway she follows me to an 

interview room.  She indicates that her mood is doing better.  She feels 

symptoms of anxiety as she feels she would like to go home but worries about 

what will happen when she goes home.  She states that she feels she would 

transition okay if she was scheduled with her therapist soon enough after 

discharge.  She reports having some shaking of her hand but identifies it is 

being anxiety she does not feel that it's due to the lithium.  She was started 

on Remeron last evening for sleep she indicates she slept throughout the night.

  She has been selectively attending group.





Mental status exam: The patient is an overweight  female appearing her 

stated age.  She remained standing for the session she is dressed in her own 

clothing.  Eye contact is appropriate he is pleasant and cooperative.  She 

indicates she feels safe and she is experiencing no acute suicidal ideation.  

She is endorsing no homicidal ideation.  She describes no auditory or visual 

hallucinations or any specific delusions.  There is no observed evidence of 

psychosis.  She does not appear hypomanic or manic.  She remains oriented to 

person place and date.  He demonstrates no involuntary repetitive movements.





Plan: The patient will continue on her current psychotropic medication.  We 

will consider if we will maintain the Remeron.  A lithium level was ordered for 

tomorrow morning.  We'll consider discharge in the next 1-2 days depending on 

her progress.  We will continue to monitor her for safety.